# Patient Record
Sex: MALE | Race: WHITE | Employment: OTHER | URBAN - METROPOLITAN AREA
[De-identification: names, ages, dates, MRNs, and addresses within clinical notes are randomized per-mention and may not be internally consistent; named-entity substitution may affect disease eponyms.]

---

## 2017-01-24 ENCOUNTER — APPOINTMENT (OUTPATIENT)
Dept: LAB | Age: 82
End: 2017-01-24
Payer: MEDICARE

## 2017-01-24 ENCOUNTER — HOSPITAL ENCOUNTER (OUTPATIENT)
Dept: RADIOLOGY | Age: 82
Discharge: HOME | End: 2017-01-24
Payer: MEDICARE

## 2017-01-24 ENCOUNTER — TRANSCRIBE ORDERS (OUTPATIENT)
Dept: ADMINISTRATIVE | Age: 82
End: 2017-01-24

## 2017-01-24 DIAGNOSIS — C67.9 MALIGNANT NEOPLASM OF OTHER SPECIFIED SITES OF BLADDER: ICD-10-CM

## 2017-01-24 DIAGNOSIS — C67.9 MALIGNANT NEOPLASM OF OTHER SPECIFIED SITES OF BLADDER: Primary | ICD-10-CM

## 2017-01-24 LAB
ALBUMIN SERPL BCP-MCNC: 4 G/DL (ref 3.5–5)
ALP SERPL-CCNC: 91 U/L (ref 46–116)
ALT SERPL W P-5'-P-CCNC: 34 U/L (ref 12–78)
ANION GAP SERPL CALCULATED.3IONS-SCNC: 11 MMOL/L (ref 4–13)
AST SERPL W P-5'-P-CCNC: 23 U/L (ref 5–45)
ATRIAL RATE: 77 BPM
BASOPHILS # BLD AUTO: 0.04 THOUSANDS/ΜL (ref 0–0.1)
BASOPHILS NFR BLD AUTO: 1 % (ref 0–1)
BILIRUB SERPL-MCNC: 1.19 MG/DL (ref 0.2–1)
BUN SERPL-MCNC: 20 MG/DL (ref 5–25)
CALCIUM SERPL-MCNC: 9.4 MG/DL (ref 8.3–10.1)
CHLORIDE SERPL-SCNC: 105 MMOL/L (ref 100–108)
CO2 SERPL-SCNC: 24 MMOL/L (ref 21–32)
CREAT SERPL-MCNC: 1.68 MG/DL (ref 0.6–1.3)
EOSINOPHIL # BLD AUTO: 0.21 THOUSAND/ΜL (ref 0–0.61)
EOSINOPHIL NFR BLD AUTO: 3 % (ref 0–6)
ERYTHROCYTE [DISTWIDTH] IN BLOOD BY AUTOMATED COUNT: 13.4 % (ref 11.6–15.1)
GFR SERPL CREATININE-BSD FRML MDRD: 39.3 ML/MIN/1.73SQ M
GLUCOSE SERPL-MCNC: 97 MG/DL (ref 65–140)
HCT VFR BLD AUTO: 42.9 % (ref 36.5–49.3)
HGB BLD-MCNC: 15.2 G/DL (ref 12–17)
LYMPHOCYTES # BLD AUTO: 1.56 THOUSANDS/ΜL (ref 0.6–4.47)
LYMPHOCYTES NFR BLD AUTO: 21 % (ref 14–44)
MCH RBC QN AUTO: 31.4 PG (ref 26.8–34.3)
MCHC RBC AUTO-ENTMCNC: 35.4 G/DL (ref 31.4–37.4)
MCV RBC AUTO: 89 FL (ref 82–98)
MONOCYTES # BLD AUTO: 0.57 THOUSAND/ΜL (ref 0.17–1.22)
MONOCYTES NFR BLD AUTO: 8 % (ref 4–12)
NEUTROPHILS # BLD AUTO: 5.09 THOUSANDS/ΜL (ref 1.85–7.62)
NEUTS SEG NFR BLD AUTO: 67 % (ref 43–75)
NRBC BLD AUTO-RTO: 0 /100 WBCS
P AXIS: 10 DEGREES
PLATELET # BLD AUTO: 210 THOUSANDS/UL (ref 149–390)
PMV BLD AUTO: 11.6 FL (ref 8.9–12.7)
POTASSIUM SERPL-SCNC: 4.1 MMOL/L (ref 3.5–5.3)
PR INTERVAL: 184 MS
PROT SERPL-MCNC: 7.4 G/DL (ref 6.4–8.2)
QRS AXIS: -22 DEGREES
QRSD INTERVAL: 94 MS
QT INTERVAL: 398 MS
QTC INTERVAL: 450 MS
RBC # BLD AUTO: 4.84 MILLION/UL (ref 3.88–5.62)
SODIUM SERPL-SCNC: 140 MMOL/L (ref 136–145)
T WAVE AXIS: 16 DEGREES
VENTRICULAR RATE: 77 BPM
WBC # BLD AUTO: 7.48 THOUSAND/UL (ref 4.31–10.16)

## 2017-01-24 PROCEDURE — 36415 COLL VENOUS BLD VENIPUNCTURE: CPT

## 2017-01-24 PROCEDURE — 71020 HB CHEST X-RAY 2VW FRONTAL&LATL: CPT

## 2017-01-24 PROCEDURE — 80053 COMPREHEN METABOLIC PANEL: CPT

## 2017-01-24 PROCEDURE — 85025 COMPLETE CBC W/AUTO DIFF WBC: CPT

## 2017-01-24 PROCEDURE — 93005 ELECTROCARDIOGRAM TRACING: CPT

## 2017-02-08 ENCOUNTER — APPOINTMENT (OUTPATIENT)
Dept: LAB | Age: 82
End: 2017-02-08
Payer: MEDICARE

## 2017-02-08 ENCOUNTER — TRANSCRIBE ORDERS (OUTPATIENT)
Dept: ADMINISTRATIVE | Age: 82
End: 2017-02-08

## 2017-02-08 DIAGNOSIS — E78.5 HYPERLIPIDEMIA: ICD-10-CM

## 2017-02-08 DIAGNOSIS — N18.30 CHRONIC KIDNEY DISEASE, STAGE III (MODERATE) (HCC): ICD-10-CM

## 2017-02-08 DIAGNOSIS — R73.9 HYPERGLYCEMIA: ICD-10-CM

## 2017-02-08 LAB
ALBUMIN SERPL BCP-MCNC: 3.8 G/DL (ref 3.5–5)
ALP SERPL-CCNC: 97 U/L (ref 46–116)
ALT SERPL W P-5'-P-CCNC: 28 U/L (ref 12–78)
ANION GAP SERPL CALCULATED.3IONS-SCNC: 8 MMOL/L (ref 4–13)
AST SERPL W P-5'-P-CCNC: 19 U/L (ref 5–45)
BASOPHILS # BLD AUTO: 0.03 THOUSANDS/ΜL (ref 0–0.1)
BASOPHILS NFR BLD AUTO: 0 % (ref 0–1)
BILIRUB SERPL-MCNC: 0.95 MG/DL (ref 0.2–1)
BUN SERPL-MCNC: 19 MG/DL (ref 5–25)
CALCIUM SERPL-MCNC: 9.2 MG/DL (ref 8.3–10.1)
CHLORIDE SERPL-SCNC: 106 MMOL/L (ref 100–108)
CHOLEST SERPL-MCNC: 100 MG/DL (ref 50–200)
CO2 SERPL-SCNC: 26 MMOL/L (ref 21–32)
CREAT SERPL-MCNC: 1.45 MG/DL (ref 0.6–1.3)
CREAT UR-MCNC: 106 MG/DL
EOSINOPHIL # BLD AUTO: 0.62 THOUSAND/ΜL (ref 0–0.61)
EOSINOPHIL NFR BLD AUTO: 8 % (ref 0–6)
ERYTHROCYTE [DISTWIDTH] IN BLOOD BY AUTOMATED COUNT: 13.6 % (ref 11.6–15.1)
EST. AVERAGE GLUCOSE BLD GHB EST-MCNC: 100 MG/DL
GFR SERPL CREATININE-BSD FRML MDRD: 46.6 ML/MIN/1.73SQ M
GLUCOSE SERPL-MCNC: 93 MG/DL (ref 65–140)
HBA1C MFR BLD: 5.1 % (ref 4.2–6.3)
HCT VFR BLD AUTO: 41.6 % (ref 36.5–49.3)
HDLC SERPL-MCNC: 49 MG/DL (ref 40–60)
HGB BLD-MCNC: 14.5 G/DL (ref 12–17)
LDLC SERPL DIRECT ASSAY-MCNC: 46 MG/DL (ref 0–100)
LYMPHOCYTES # BLD AUTO: 1.23 THOUSANDS/ΜL (ref 0.6–4.47)
LYMPHOCYTES NFR BLD AUTO: 17 % (ref 14–44)
MCH RBC QN AUTO: 31.3 PG (ref 26.8–34.3)
MCHC RBC AUTO-ENTMCNC: 34.9 G/DL (ref 31.4–37.4)
MCV RBC AUTO: 90 FL (ref 82–98)
MONOCYTES # BLD AUTO: 0.57 THOUSAND/ΜL (ref 0.17–1.22)
MONOCYTES NFR BLD AUTO: 8 % (ref 4–12)
NEUTROPHILS # BLD AUTO: 4.93 THOUSANDS/ΜL (ref 1.85–7.62)
NEUTS SEG NFR BLD AUTO: 67 % (ref 43–75)
NRBC BLD AUTO-RTO: 0 /100 WBCS
PLATELET # BLD AUTO: 199 THOUSANDS/UL (ref 149–390)
PMV BLD AUTO: 11.5 FL (ref 8.9–12.7)
POTASSIUM SERPL-SCNC: 4.2 MMOL/L (ref 3.5–5.3)
PROT SERPL-MCNC: 7.4 G/DL (ref 6.4–8.2)
PROT UR-MCNC: 44 MG/DL
PROT/CREAT UR: 0.42 MG/G{CREAT} (ref 0–0.1)
RBC # BLD AUTO: 4.63 MILLION/UL (ref 3.88–5.62)
SODIUM SERPL-SCNC: 140 MMOL/L (ref 136–145)
TRIGL SERPL-MCNC: 61 MG/DL
WBC # BLD AUTO: 7.39 THOUSAND/UL (ref 4.31–10.16)

## 2017-02-08 PROCEDURE — 83721 ASSAY OF BLOOD LIPOPROTEIN: CPT

## 2017-02-08 PROCEDURE — 80053 COMPREHEN METABOLIC PANEL: CPT

## 2017-02-08 PROCEDURE — 83036 HEMOGLOBIN GLYCOSYLATED A1C: CPT

## 2017-02-08 PROCEDURE — 36415 COLL VENOUS BLD VENIPUNCTURE: CPT

## 2017-02-08 PROCEDURE — 84156 ASSAY OF PROTEIN URINE: CPT

## 2017-02-08 PROCEDURE — 85025 COMPLETE CBC W/AUTO DIFF WBC: CPT

## 2017-02-08 PROCEDURE — 80061 LIPID PANEL: CPT

## 2017-02-08 PROCEDURE — 82570 ASSAY OF URINE CREATININE: CPT

## 2017-02-15 ENCOUNTER — ALLSCRIPTS OFFICE VISIT (OUTPATIENT)
Dept: OTHER | Facility: OTHER | Age: 82
End: 2017-02-15

## 2017-03-09 ENCOUNTER — GENERIC CONVERSION - ENCOUNTER (OUTPATIENT)
Dept: OTHER | Facility: OTHER | Age: 82
End: 2017-03-09

## 2017-03-22 ENCOUNTER — GENERIC CONVERSION - ENCOUNTER (OUTPATIENT)
Dept: OTHER | Facility: OTHER | Age: 82
End: 2017-03-22

## 2017-04-04 ENCOUNTER — GENERIC CONVERSION - ENCOUNTER (OUTPATIENT)
Dept: OTHER | Facility: OTHER | Age: 82
End: 2017-04-04

## 2017-05-30 ENCOUNTER — APPOINTMENT (OUTPATIENT)
Dept: LAB | Age: 82
End: 2017-05-30
Payer: MEDICARE

## 2017-05-30 ENCOUNTER — TRANSCRIBE ORDERS (OUTPATIENT)
Dept: ADMINISTRATIVE | Age: 82
End: 2017-05-30

## 2017-05-30 DIAGNOSIS — C67.8 MALIGNANT NEOPLASM OF OVERLAPPING SITES OF BLADDER (HCC): ICD-10-CM

## 2017-05-30 DIAGNOSIS — C67.8 MALIGNANT NEOPLASM OF OVERLAPPING SITES OF BLADDER (HCC): Primary | ICD-10-CM

## 2017-05-30 LAB
ANION GAP SERPL CALCULATED.3IONS-SCNC: 6 MMOL/L (ref 4–13)
BASOPHILS # BLD AUTO: 0.04 THOUSANDS/ΜL (ref 0–0.1)
BASOPHILS NFR BLD AUTO: 1 % (ref 0–1)
BUN SERPL-MCNC: 31 MG/DL (ref 5–25)
CALCIUM SERPL-MCNC: 9.5 MG/DL (ref 8.3–10.1)
CHLORIDE SERPL-SCNC: 110 MMOL/L (ref 100–108)
CO2 SERPL-SCNC: 27 MMOL/L (ref 21–32)
CREAT SERPL-MCNC: 1.47 MG/DL (ref 0.6–1.3)
EOSINOPHIL # BLD AUTO: 0.3 THOUSAND/ΜL (ref 0–0.61)
EOSINOPHIL NFR BLD AUTO: 5 % (ref 0–6)
ERYTHROCYTE [DISTWIDTH] IN BLOOD BY AUTOMATED COUNT: 13.8 % (ref 11.6–15.1)
GFR SERPL CREATININE-BSD FRML MDRD: 45.9 ML/MIN/1.73SQ M
GLUCOSE P FAST SERPL-MCNC: 109 MG/DL (ref 65–99)
HCT VFR BLD AUTO: 44.3 % (ref 36.5–49.3)
HGB BLD-MCNC: 15.3 G/DL (ref 12–17)
LYMPHOCYTES # BLD AUTO: 1.53 THOUSANDS/ΜL (ref 0.6–4.47)
LYMPHOCYTES NFR BLD AUTO: 23 % (ref 14–44)
MCH RBC QN AUTO: 30.7 PG (ref 26.8–34.3)
MCHC RBC AUTO-ENTMCNC: 34.5 G/DL (ref 31.4–37.4)
MCV RBC AUTO: 89 FL (ref 82–98)
MONOCYTES # BLD AUTO: 0.56 THOUSAND/ΜL (ref 0.17–1.22)
MONOCYTES NFR BLD AUTO: 8 % (ref 4–12)
NEUTROPHILS # BLD AUTO: 4.29 THOUSANDS/ΜL (ref 1.85–7.62)
NEUTS SEG NFR BLD AUTO: 63 % (ref 43–75)
NRBC BLD AUTO-RTO: 0 /100 WBCS
PLATELET # BLD AUTO: 191 THOUSANDS/UL (ref 149–390)
PMV BLD AUTO: 11.6 FL (ref 8.9–12.7)
POTASSIUM SERPL-SCNC: 4.1 MMOL/L (ref 3.5–5.3)
RBC # BLD AUTO: 4.98 MILLION/UL (ref 3.88–5.62)
SODIUM SERPL-SCNC: 143 MMOL/L (ref 136–145)
WBC # BLD AUTO: 6.74 THOUSAND/UL (ref 4.31–10.16)

## 2017-05-30 PROCEDURE — 85025 COMPLETE CBC W/AUTO DIFF WBC: CPT

## 2017-05-30 PROCEDURE — 80048 BASIC METABOLIC PNL TOTAL CA: CPT

## 2017-05-30 PROCEDURE — 36415 COLL VENOUS BLD VENIPUNCTURE: CPT

## 2017-06-08 ENCOUNTER — GENERIC CONVERSION - ENCOUNTER (OUTPATIENT)
Dept: OTHER | Facility: OTHER | Age: 82
End: 2017-06-08

## 2017-08-07 DIAGNOSIS — E78.5 HYPERLIPIDEMIA: ICD-10-CM

## 2017-08-07 DIAGNOSIS — N18.30 CHRONIC KIDNEY DISEASE, STAGE III (MODERATE) (HCC): ICD-10-CM

## 2017-08-07 DIAGNOSIS — E55.9 VITAMIN D DEFICIENCY: ICD-10-CM

## 2017-08-07 DIAGNOSIS — I10 ESSENTIAL (PRIMARY) HYPERTENSION: ICD-10-CM

## 2017-08-07 DIAGNOSIS — R73.9 HYPERGLYCEMIA: ICD-10-CM

## 2017-08-09 ENCOUNTER — APPOINTMENT (OUTPATIENT)
Dept: LAB | Age: 82
End: 2017-08-09
Payer: MEDICARE

## 2017-08-09 ENCOUNTER — TRANSCRIBE ORDERS (OUTPATIENT)
Dept: ADMINISTRATIVE | Age: 82
End: 2017-08-09

## 2017-08-09 DIAGNOSIS — I10 ESSENTIAL (PRIMARY) HYPERTENSION: ICD-10-CM

## 2017-08-09 DIAGNOSIS — R73.9 HYPERGLYCEMIA: ICD-10-CM

## 2017-08-09 DIAGNOSIS — E78.5 HYPERLIPIDEMIA: ICD-10-CM

## 2017-08-09 DIAGNOSIS — E55.9 VITAMIN D DEFICIENCY: ICD-10-CM

## 2017-08-09 DIAGNOSIS — N18.30 CHRONIC KIDNEY DISEASE, STAGE III (MODERATE) (HCC): ICD-10-CM

## 2017-08-09 LAB
25(OH)D3 SERPL-MCNC: 29.9 NG/ML (ref 30–100)
ALBUMIN SERPL BCP-MCNC: 4.1 G/DL (ref 3.5–5)
ALP SERPL-CCNC: 97 U/L (ref 46–116)
ALT SERPL W P-5'-P-CCNC: 31 U/L (ref 12–78)
ANION GAP SERPL CALCULATED.3IONS-SCNC: 8 MMOL/L (ref 4–13)
AST SERPL W P-5'-P-CCNC: 25 U/L (ref 5–45)
BILIRUB SERPL-MCNC: 0.9 MG/DL (ref 0.2–1)
BUN SERPL-MCNC: 26 MG/DL (ref 5–25)
CALCIUM SERPL-MCNC: 9.7 MG/DL (ref 8.3–10.1)
CHLORIDE SERPL-SCNC: 106 MMOL/L (ref 100–108)
CHOLEST SERPL-MCNC: 98 MG/DL (ref 50–200)
CO2 SERPL-SCNC: 25 MMOL/L (ref 21–32)
CREAT SERPL-MCNC: 1.73 MG/DL (ref 0.6–1.3)
EST. AVERAGE GLUCOSE BLD GHB EST-MCNC: 103 MG/DL
GFR SERPL CREATININE-BSD FRML MDRD: 36 ML/MIN/1.73SQ M
GLUCOSE P FAST SERPL-MCNC: 103 MG/DL (ref 65–99)
HBA1C MFR BLD: 5.2 % (ref 4.2–6.3)
HDLC SERPL-MCNC: 48 MG/DL (ref 40–60)
LDLC SERPL DIRECT ASSAY-MCNC: 46 MG/DL (ref 0–100)
POTASSIUM SERPL-SCNC: 4 MMOL/L (ref 3.5–5.3)
PROT SERPL-MCNC: 7.6 G/DL (ref 6.4–8.2)
SODIUM SERPL-SCNC: 139 MMOL/L (ref 136–145)
TRIGL SERPL-MCNC: 64 MG/DL

## 2017-08-09 PROCEDURE — 80061 LIPID PANEL: CPT

## 2017-08-09 PROCEDURE — 82306 VITAMIN D 25 HYDROXY: CPT

## 2017-08-09 PROCEDURE — 80053 COMPREHEN METABOLIC PANEL: CPT

## 2017-08-09 PROCEDURE — 83721 ASSAY OF BLOOD LIPOPROTEIN: CPT

## 2017-08-09 PROCEDURE — 83036 HEMOGLOBIN GLYCOSYLATED A1C: CPT

## 2017-08-09 PROCEDURE — 36415 COLL VENOUS BLD VENIPUNCTURE: CPT

## 2017-08-14 ENCOUNTER — ALLSCRIPTS OFFICE VISIT (OUTPATIENT)
Dept: OTHER | Facility: OTHER | Age: 82
End: 2017-08-14

## 2017-09-07 ENCOUNTER — GENERIC CONVERSION - ENCOUNTER (OUTPATIENT)
Dept: OTHER | Facility: OTHER | Age: 82
End: 2017-09-07

## 2017-11-20 ENCOUNTER — GENERIC CONVERSION - ENCOUNTER (OUTPATIENT)
Dept: INTERNAL MEDICINE CLINIC | Facility: CLINIC | Age: 82
End: 2017-11-20

## 2017-11-20 ENCOUNTER — GENERIC CONVERSION - ENCOUNTER (OUTPATIENT)
Dept: OTHER | Facility: OTHER | Age: 82
End: 2017-11-20

## 2017-12-04 ENCOUNTER — TRANSCRIBE ORDERS (OUTPATIENT)
Dept: ADMINISTRATIVE | Age: 82
End: 2017-12-04

## 2017-12-04 ENCOUNTER — APPOINTMENT (OUTPATIENT)
Dept: LAB | Age: 82
End: 2017-12-04
Payer: MEDICARE

## 2017-12-04 ENCOUNTER — HOSPITAL ENCOUNTER (OUTPATIENT)
Dept: NON INVASIVE DIAGNOSTICS | Facility: CLINIC | Age: 82
Discharge: HOME | End: 2017-12-04
Payer: MEDICARE

## 2017-12-04 ENCOUNTER — GENERIC CONVERSION - ENCOUNTER (OUTPATIENT)
Dept: OTHER | Facility: OTHER | Age: 82
End: 2017-12-04

## 2017-12-04 DIAGNOSIS — I65.29 OCCLUSION AND STENOSIS OF UNSPECIFIED CAROTID ARTERY: ICD-10-CM

## 2017-12-04 DIAGNOSIS — I10 ESSENTIAL (PRIMARY) HYPERTENSION: ICD-10-CM

## 2017-12-04 DIAGNOSIS — E78.5 HYPERLIPIDEMIA: ICD-10-CM

## 2017-12-04 LAB
ALBUMIN SERPL BCP-MCNC: 3.6 G/DL (ref 3.5–5)
ALP SERPL-CCNC: 97 U/L (ref 46–116)
ALT SERPL W P-5'-P-CCNC: 32 U/L (ref 12–78)
ANION GAP SERPL CALCULATED.3IONS-SCNC: 8 MMOL/L (ref 4–13)
AST SERPL W P-5'-P-CCNC: 28 U/L (ref 5–45)
BASOPHILS # BLD AUTO: 0.04 THOUSANDS/ΜL (ref 0–0.1)
BASOPHILS NFR BLD AUTO: 0 % (ref 0–1)
BILIRUB SERPL-MCNC: 0.58 MG/DL (ref 0.2–1)
BUN SERPL-MCNC: 20 MG/DL (ref 5–25)
CALCIUM SERPL-MCNC: 9.3 MG/DL (ref 8.3–10.1)
CHLORIDE SERPL-SCNC: 105 MMOL/L (ref 100–108)
CO2 SERPL-SCNC: 27 MMOL/L (ref 21–32)
CREAT SERPL-MCNC: 1.28 MG/DL (ref 0.6–1.3)
EOSINOPHIL # BLD AUTO: 0.2 THOUSAND/ΜL (ref 0–0.61)
EOSINOPHIL NFR BLD AUTO: 2 % (ref 0–6)
ERYTHROCYTE [DISTWIDTH] IN BLOOD BY AUTOMATED COUNT: 13.5 % (ref 11.6–15.1)
ERYTHROCYTE [SEDIMENTATION RATE] IN BLOOD: 33 MM/HOUR (ref 0–10)
GFR SERPL CREATININE-BSD FRML MDRD: 51 ML/MIN/1.73SQ M
GLUCOSE SERPL-MCNC: 81 MG/DL (ref 65–140)
HCT VFR BLD AUTO: 40 % (ref 36.5–49.3)
HGB BLD-MCNC: 14 G/DL (ref 12–17)
LYMPHOCYTES # BLD AUTO: 1.6 THOUSANDS/ΜL (ref 0.6–4.47)
LYMPHOCYTES NFR BLD AUTO: 17 % (ref 14–44)
MCH RBC QN AUTO: 31 PG (ref 26.8–34.3)
MCHC RBC AUTO-ENTMCNC: 35 G/DL (ref 31.4–37.4)
MCV RBC AUTO: 89 FL (ref 82–98)
MONOCYTES # BLD AUTO: 0.9 THOUSAND/ΜL (ref 0.17–1.22)
MONOCYTES NFR BLD AUTO: 10 % (ref 4–12)
NEUTROPHILS # BLD AUTO: 6.42 THOUSANDS/ΜL (ref 1.85–7.62)
NEUTS SEG NFR BLD AUTO: 71 % (ref 43–75)
NRBC BLD AUTO-RTO: 0 /100 WBCS
PLATELET # BLD AUTO: 284 THOUSANDS/UL (ref 149–390)
PMV BLD AUTO: 10.6 FL (ref 8.9–12.7)
POTASSIUM SERPL-SCNC: 4.2 MMOL/L (ref 3.5–5.3)
PROT SERPL-MCNC: 7.6 G/DL (ref 6.4–8.2)
RBC # BLD AUTO: 4.52 MILLION/UL (ref 3.88–5.62)
SODIUM SERPL-SCNC: 140 MMOL/L (ref 136–145)
TSH SERPL DL<=0.05 MIU/L-ACNC: 3.28 UIU/ML (ref 0.36–3.74)
VIT B12 SERPL-MCNC: 595 PG/ML (ref 100–900)
WBC # BLD AUTO: 9.19 THOUSAND/UL (ref 4.31–10.16)

## 2017-12-04 PROCEDURE — 93880 EXTRACRANIAL BILAT STUDY: CPT

## 2017-12-04 PROCEDURE — 85652 RBC SED RATE AUTOMATED: CPT

## 2017-12-04 PROCEDURE — 84443 ASSAY THYROID STIM HORMONE: CPT

## 2017-12-04 PROCEDURE — 80053 COMPREHEN METABOLIC PANEL: CPT

## 2017-12-04 PROCEDURE — 85025 COMPLETE CBC W/AUTO DIFF WBC: CPT

## 2017-12-04 PROCEDURE — 36415 COLL VENOUS BLD VENIPUNCTURE: CPT

## 2017-12-04 PROCEDURE — 82607 VITAMIN B-12: CPT

## 2017-12-28 ENCOUNTER — GENERIC CONVERSION - ENCOUNTER (OUTPATIENT)
Dept: OTHER | Facility: OTHER | Age: 82
End: 2017-12-28

## 2018-01-13 NOTE — PROGRESS NOTES
Assessment    1  Malignant tumor of urinary bladder (188 9) (C67 9)    Plan  Malignant tumor of urinary bladder    · Urine Dip Non-Automated- POC; Status:Complete - Retrospective By Protocol  Authorization;   Done: [id number] 12:54PM   Performed: In Office; RBV:[id number]; Last Updated By:Ewa Silverio; 1/21/2016 12:56:09 PM;Ordered; For:Malignant tumor of urinary bladder; Ordered By:Tomi Elkins; Discussion/Summary  Discussion Summary:   40-year-old male with recurrent superficial high-grade bladder cancer  I reviewed the pathology results with the patient and his daughter  He had a small focus of high-grade cancer  There was no evidence of muscle invasion  We will try another course of induction BCG therapy  If the patient is to fail this he will need more aggressive therapy such as cystectomy or chemotherapy with radiation  Chief Complaint  Chief Complaint Free Text Note Form: s/p TURBT (1-6-16)      History of Present Illness  HPI: 40-year-old male presents for followup for high-grade bladder cancer status post recent reresection of his previous tumor site  The patient did well after the procedure  He has no new complaints  Review of Systems  Complete-Male Urology:   Constitutional: No fever or chills, feels well, no tiredness, no recent weight gain or weight loss  Respiratory: No complaints of shortness of breath, no wheezing, no cough, no SOB on exertion, no orthopnea or PND  Cardiovascular: No complaints of slow heart rate, no fast heart rate, no chest pain, no palpitations, no leg claudication, no lower extremity  Gastrointestinal: No complaints of abdominal pain, no constipation, no nausea or vomiting, no diarrhea or bloody stools  Genitourinary: Empty sensation and stream quality good, but no dysuria, no urinary hesitancy, no hematuria, no incontinence and no feelings of urinary urgency    The patient presents with complaints of nocturia (1x)     Musculoskeletal: No complaints of arthralgia, no myalgias, no joint swelling or stiffness, no limb pain or swelling  Integumentary: No complaints of skin rash or skin lesions, no itching, no skin wound, no dry skin  Hematologic/Lymphatic: No complaints of swollen glands, no swollen glands in the neck, does not bleed easily, no easy bruising  Neurological: No compliants of headache, no confusion, no convulsions, no numbness or tingling, no dizziness or fainting, no limb weakness, no difficulty walking  ROS Reviewed:   ROS reviewed  Active Problems    1  Arthritis (716 90) (M19 90)   2  Asymptomatic carotid artery stenosis (433 10) (I65 29)   3  Atherosclerosis (440 9) (I70 90)   4  Benign prostatic hypertrophy (600 00) (N40 0)   5  Bladder cancer (188 9) (C67 9)   6  CAD (coronary atherosclerotic disease) (414 00) (I25 10)   7  Chronic kidney disease, stage 3 (585 3) (N18 3)   8  Chronic obstructive pulmonary disease (496) (J44 9)   9  Dyspnea (786 09) (R06 00)   10  Flank pain (789 09) (R10 9)   11  Hematuria (599 70) (R31 9)   12  Hyperglycemia (790 29) (R73 9)   13  Hyperlipidemia (272 4) (E78 5)   14  Hypertension (401 9) (I10)   15  Malignant tumor of urinary bladder (188 9) (C67 9)   16  Microhematuria (599 72) (R31 2)   17  Need for prophylactic vaccination and inoculation against influenza (V04 81) (Z23)   18  Need for vaccination with 13-polyvalent pneumococcal conjugate vaccine (V03 82) (Z23)   19  Pain, joint, shoulder (719 41) (M25 519)   20  Right rotator cuff tear (840 4) (M75 101)   21  Visit for pre-operative examination (V72 84) (Z01 818)   22  Visit for pre-operative examination (V72 84) (Q07 193)    Past Medical History    1  History of urinary tract infection (V13 02) (Z87 440)   2  History of Postoperative visit (V58 49) (Z48 89)   3  History of Preop pulmonary/respiratory exam (V72 82) (L72 989)  Active Problems And Past Medical History Reviewed:    The active problems and past medical history were reviewed and updated today  Surgical History    1  History of CABG   2  History of Cardiovascular Stress Test   3  History of Colonoscopy (Fiberoptic)   4  History of Diagnostic Cystoscopy   5  History of Tonsillectomy (28 2)  Surgical History Reviewed: The surgical history was reviewed and updated today  Family History    1  Family history of Atherosclerosis (V17 49)   2  Family history of Hyperlipidemia    3  Family history of Hyperlipidemia    4  Family history of Atherosclerosis (V17 49)   5  Family history of Hyperlipidemia   6  Family history of Hypertension (V17 49)   7  Family history of Osteoarthritis (V17 7)  Family History Reviewed: The family history was reviewed and updated today  Social History    · Being A Social Drinker   · Daily Coffee Consumption (5  Cups/Day)   · Denied: History of Drug Use   · Former smoker (Q11 80) (J02 421)  Social History Reviewed: The social history was reviewed and updated today  Current Meds   1  AmLODIPine Besylate 5 MG Oral Tablet; TAKE 1 TABLET DAILY; Therapy: (Recorded:31Btf2883) to Recorded   2  Aspirin 81 MG Oral Tablet Delayed Release; TAKE 1 TABLET DAILY; Therapy: (Recorded:67Jig8205) to Recorded   3  Atorvastatin Calcium 80 MG Oral Tablet; TAKE 1 TABLET AT BEDTIME; Therapy: (Recorded:61Ycs4209) to Recorded   4  Fosinopril Sodium 10 MG Oral Tablet; take 1/2 tablet daily; Therapy: (Recorded:42Xse5706) to Recorded   5  Glucosamine-Chondroitin Oral Tablet; Take 1 tablet daily; Therapy: (Recorded:84Ahd6163) to Recorded   6  Loratadine 10 MG Oral Tablet; TAKE 1 TABLET DAILY AS NEEDED; Therapy: 27CSF4118 to Recorded   7  Metoprolol Succinate ER 25 MG Oral Tablet Extended Release 24 Hour; TAKE 1 TABLET   ONCE DAILY; Therapy: (Recorded:27Taf1023) to Recorded   8  Omega-3 Fish Oil 1000 MG Oral Capsule; 2 CAPSULES DAILY; Therapy: 46WBP7941 to Recorded   9   Proventil  (90 Base) MCG/ACT Inhalation Aerosol Solution; INHALE 2 PUFFS BY   MOUTH EVERY 6 HOURS AS NEEDED; Therapy: 00FCD6388 to (Evaluate:11Aug2015); Last Rx:08Jil3259 Ordered   10  Singulair 10 MG Oral Tablet; TAKE 1 TABLET DAILY; Therapy: (Recorded:12Feb2015) to Recorded   11  Spiriva HandiHaler 18 MCG Inhalation Capsule; inhale the contents of one capsule in    the handihaler once daily; Therapy: 09Apr2012 to (Evaluate:21Apr2016)  Requested for: 30Apr2015; Last    Rx:27Apr2015 Ordered   12  Symbicort 160-4 5 MCG/ACT Inhalation Aerosol; INHALE 2 PUFFS TWICE DAILY  RINSE    MOUTH AFTER USE; Therapy: (Recorded:27Mmv6684) to Recorded   13  Zetia 10 MG Oral Tablet; TAKE 1 TABLET DAILY; Therapy: (Recorded:12Feb2015) to Recorded  Medication List Reviewed: The medication list was reviewed and updated today  Allergies    1  ACE Inhibitors   2  Ciprofloxacin HCl TABS   3  Erythromycin Base TABS   4  Lamisil CREA   5  Lisinopril TABS   6  NSAIDs    Vitals  Vital Signs [Data Includes: Current Encounter]    Recorded: 21Jan2016 12:50PM   Heart Rate 80   Systolic 609   Diastolic 70   Height 5 ft 7 in   Weight 174 lb 8 0 oz   BMI Calculated 27 33   BSA Calculated 1 91     Physical Exam    Constitutional   General appearance: No acute distress, well appearing and well nourished  Pulmonary   Respiratory effort: No increased work of breathing or signs of respiratory distress  Cardiovascular   Examination of extremities for edema and/or varicosities: Normal     Abdomen   Abdomen: Non-tender, no masses  Liver and spleen: No hepatomegaly or splenomegaly  Genitourinary   Scrotum contents: Normal size, no masses  Penis: Normal, no lesions      Additional Exam:  Neuro exam nonfocal       Results/Data  Encounter Results   Urine Dip Non-Automated- POC 11ZEA1153 12:54PM [place]     Test Name Result Flag Reference   Color Yellow     Clarity Transparent     Leukocytes -     Nitrite -     Blood moderate     Protein -     Ph 6 5     Specific Gravity 1 015     Ketone -     Glucose - Future Appointments    Date/Time Provider Specialty Site   03/02/2016 01:00 PM [facility]  [facility] [address] [address] [place]   03/09/2016 01:00 PM [facility] Schedule  [facility] [address] [address] [place]   03/16/2016 01:00 PM [facility]  [facility] [address] [address] [place]   03/23/2016 01:00 PM [facility] Schedule  [facility] [address] [address] [place]   03/30/2016 01:00 PM [facility] Schedule  [facility] [address] [address] [place]   04/06/2016 01:00 PM [facility]  [facility] [address] [address] [place]   04/12/2016 08:00 AM [facility] Adams   Internal Medicine Sanjana Darling MD     Signatures   Electronically signed by : LORNA Álvarez ; Jan 21 2016  1:29PM EST                       (Author)

## 2018-01-14 VITALS
DIASTOLIC BLOOD PRESSURE: 78 MMHG | SYSTOLIC BLOOD PRESSURE: 130 MMHG | BODY MASS INDEX: 27.49 KG/M2 | HEIGHT: 67 IN | RESPIRATION RATE: 14 BRPM | HEART RATE: 72 BPM | WEIGHT: 175.13 LBS

## 2018-01-14 VITALS
HEART RATE: 78 BPM | HEIGHT: 67 IN | BODY MASS INDEX: 27.94 KG/M2 | RESPIRATION RATE: 14 BRPM | WEIGHT: 178 LBS | DIASTOLIC BLOOD PRESSURE: 74 MMHG | SYSTOLIC BLOOD PRESSURE: 134 MMHG

## 2018-01-15 ENCOUNTER — TRANSCRIBE ORDERS (OUTPATIENT)
Dept: ADMINISTRATIVE | Facility: HOSPITAL | Age: 83
End: 2018-01-15

## 2018-01-15 DIAGNOSIS — R41.3 MEMORY LOSS: Primary | ICD-10-CM

## 2018-01-15 NOTE — PROCEDURES
Plan  Bladder cancer    · Shell BCG 50 MG Intravesical Suspension Reconstituted   Rx By: Dania Sequeira; For: Bladder cancer; Dose of 50 MG; Intravesical; SOHAIL = N; Administered by: Ruchi Harding RN: 3/23/2016 12:58:00 PM; Last Updated By: Ruchi Harding   · Urine Dip Non-Automated- POC; Status:Complete - Retrospective By Protocol  Authorization;   Done: 64TJQ1074 12:55PM   Performed: In Office; CYY:[id number];DEMGXVS; For:Bladder cancer; Ordered By:Tomi Elkins; Chief Complaint  Chief Complaint Free Text Note Form: Patient presents for BCG 4/6: bladder cancer      Review of Systems  Complete-Male Urology:   Genitourinary: Empty sensation and stream quality good, but no dysuria, no urinary hesitancy, no hematuria, no incontinence and no feelings of urinary urgency    The patient presents with complaints of nocturia (2x)  Active Problems    1  Arthritis (716 90) (M19 90)   2  Asymptomatic carotid artery stenosis (433 10) (I65 29)   3  Atherosclerosis (440 9) (I70 90)   4  Benign prostatic hypertrophy (600 00) (N40 0)   5  Bladder cancer (188 9) (C67 9)   6  CAD (coronary atherosclerotic disease) (414 00) (I25 10)   7  Chronic kidney disease, stage 3 (585 3) (N18 3)   8  Chronic obstructive pulmonary disease (496) (J44 9)   9  Dyspnea (786 09) (R06 00)   10  Flank pain (789 09) (R10 9)   11  Hematuria (599 70) (R31 9)   12  Hyperglycemia (790 29) (R73 9)   13  Hyperlipidemia (272 4) (E78 5)   14  Hypertension (401 9) (I10)   15  Malignant tumor of urinary bladder (188 9) (C67 9)   16  Microhematuria (599 72) (R31 2)   17  Need for prophylactic vaccination and inoculation against influenza (V04 81) (Z23)   18  Need for vaccination with 13-polyvalent pneumococcal conjugate vaccine (V03 82) (Z23)   19  Pain, joint, shoulder (719 41) (M25 519)   20  Right rotator cuff tear (840 4) (M75 101)   21  Visit for pre-operative examination (V72 84) (Z01 818)   22   Visit for pre-operative examination (V72 84) (T50 049)    Past Medical History    1  History of urinary tract infection (V13 02) (Z87 440)   2  History of Postoperative visit (V58 49) (Z48 89)   3  History of Preop pulmonary/respiratory exam (V72 82) (Z01 811)    Surgical History    1  History of CABG   2  History of Cardiovascular Stress Test   3  History of Colonoscopy (Fiberoptic)   4  History of Diagnostic Cystoscopy   5  History of Tonsillectomy (28 2)    Family History    1  Family history of Atherosclerosis (V17 49)   2  Family history of Hyperlipidemia    3  Family history of Hyperlipidemia    4  Family history of Atherosclerosis (V17 49)   5  Family history of Hyperlipidemia   6  Family history of Hypertension (V17 49)   7  Family history of Osteoarthritis (V17 7)    Social History    · Being A Social Drinker   · Daily Coffee Consumption (5  Cups/Day)   · Denied: History of Drug Use   · Former smoker (V15 82) (O29 721)    Current Meds   1  AmLODIPine Besylate 5 MG Oral Tablet; TAKE 1 TABLET DAILY; Therapy: (Recorded:45Iuu6748) to Recorded   2  Aspirin 81 MG Oral Tablet Delayed Release; TAKE 1 TABLET DAILY; Therapy: (Recorded:22Xsp8220) to Recorded   3  Atorvastatin Calcium 80 MG Oral Tablet; TAKE 1 TABLET AT BEDTIME; Therapy: (Recorded:49Thj8631) to Recorded   4  Fosinopril Sodium 10 MG Oral Tablet; take 1/2 tablet daily; Therapy: (Recorded:98Dbj8162) to Recorded   5  Glucosamine-Chondroitin Oral Tablet; Take 1 tablet daily; Therapy: (Recorded:61Ehg8914) to Recorded   6  Loratadine 10 MG Oral Tablet; TAKE 1 TABLET DAILY AS NEEDED; Therapy: 15RLS5618 to Recorded   7  Metoprolol Succinate ER 25 MG Oral Tablet Extended Release 24 Hour; TAKE 1 TABLET   ONCE DAILY; Therapy: (Recorded:94Jce8090) to Recorded   8  Omega-3 Fish Oil 1000 MG Oral Capsule; 2 CAPSULES DAILY; Therapy: 45ARB0140 to Recorded   9  Proventil  (90 Base) MCG/ACT Inhalation Aerosol Solution; INHALE 2 PUFFS BY   MOUTH EVERY 6 HOURS AS NEEDED;    Therapy: 58CXH0839 to (Evaluate:11Aug2015); Last Rx:15Hhg0530 Ordered   10  Singulair 10 MG Oral Tablet (Montelukast Sodium); TAKE 1 TABLET DAILY; Therapy: (Recorded:67Jgm5414) to Recorded   11  Spiriva HandiHaler 18 MCG Inhalation Capsule; inhale the contents of one capsule in    the handihaler once daily; Therapy: 09Apr2012 to (Evaluate:21Apr2016)  Requested for: 30Apr2015; Last    Rx:27Apr2015 Ordered   12  Symbicort 160-4 5 MCG/ACT Inhalation Aerosol; INHALE 2 PUFFS TWICE DAILY  RINSE    MOUTH AFTER USE; Therapy: (Recorded:23Hqp1381) to Recorded   13  Zetia 10 MG Oral Tablet; TAKE 1 TABLET DAILY; Therapy: (Recorded:46Jfz2789) to Recorded    Allergies    1  ACE Inhibitors   2  Ciprofloxacin HCl TABS   3  Erythromycin Base TABS   4  Lamisil CREA   5  Lisinopril TABS   6  NSAIDs    Vitals  Vital Signs [Data Includes: Current Encounter]    Recorded: [id number] 12:54PM   Heart Rate 72   Systolic 375   Diastolic 80   Height 5 ft 7 in   Weight 174 lb    BMI Calculated 27 25   BSA Calculated 1 91     Procedure  Sterile technique employed  Patient prepped and identified  Straight catheter placed  Bladder drained  The following solution was instilled directly into the bladder via catheter: 1 Vial BCG  Catheter removed  Patient discharged  Patient tolerated procedure   !$F Patient stated that his flu like symptoms were better last week  Future Appointments    Date/Time Provider Specialty Site   03/30/2016 01:00 PM [facility]   [address]   04/06/2016 01:00 [facility]   04/12/2016 08:00 AM [facility] Crespo   Internal Medicine Hernan Pena MD     Signatures   Electronically signed by : Solis Kuhn RN; Mar 23 2016 12:59PM EST                       (Author)    Electronically signed by : LORNA Velazquez ; Mar 25 2016  8:26AM EST

## 2018-01-22 VITALS — HEIGHT: 67 IN | BODY MASS INDEX: 28.16 KG/M2 | WEIGHT: 179.38 LBS

## 2018-01-22 VITALS — SYSTOLIC BLOOD PRESSURE: 130 MMHG | HEART RATE: 68 BPM | RESPIRATION RATE: 14 BRPM | DIASTOLIC BLOOD PRESSURE: 80 MMHG

## 2018-01-24 ENCOUNTER — OFFICE VISIT (OUTPATIENT)
Dept: AUDIOLOGY | Age: 83
End: 2018-01-24
Payer: MEDICARE

## 2018-01-24 VITALS — BODY MASS INDEX: 28.41 KG/M2 | HEIGHT: 67 IN | WEIGHT: 181 LBS

## 2018-01-24 DIAGNOSIS — H90.3 SENSORINEURAL HEARING LOSS, BILATERAL: Primary | ICD-10-CM

## 2018-01-24 PROCEDURE — V5264 EAR MOLD/INSERT: HCPCS | Performed by: AUDIOLOGIST

## 2018-01-24 NOTE — PROGRESS NOTES
Progress Note    Name:  Ruthann Chong  :  1934  Age:  80 y o  Date of Evaluation: 18   Time In: 9:30  Time Out: 9:45    Dispensed remade micro mold  Patient happy with fit  Patient reports concern over possible decrease in hearing in right ear  Discussed recommendation of hearing evaluation and either HAE or explore possibility of reprogramming current hearing aid          Minna Munoz , CCC-A  Clinical Audiologist

## 2018-02-02 ENCOUNTER — HOSPITAL ENCOUNTER (OUTPATIENT)
Dept: RADIOLOGY | Age: 83
Discharge: HOME | End: 2018-02-02
Payer: MEDICARE

## 2018-02-02 DIAGNOSIS — R41.3 MEMORY LOSS: ICD-10-CM

## 2018-02-02 PROCEDURE — 70551 MRI BRAIN STEM W/O DYE: CPT

## 2018-02-03 ENCOUNTER — DOCUMENTATION (OUTPATIENT)
Dept: INTERNAL MEDICINE CLINIC | Facility: CLINIC | Age: 83
End: 2018-02-03

## 2018-02-03 NOTE — PROGRESS NOTES
MRI of the brain shows mild to moderate small vessel disease in age-related changes but otherwise normal

## 2018-02-05 DIAGNOSIS — I10 ESSENTIAL (PRIMARY) HYPERTENSION: ICD-10-CM

## 2018-02-05 DIAGNOSIS — E78.5 HYPERLIPIDEMIA: ICD-10-CM

## 2018-02-05 DIAGNOSIS — N18.30 CHRONIC KIDNEY DISEASE, STAGE III (MODERATE) (HCC): ICD-10-CM

## 2018-02-23 ENCOUNTER — DOCUMENTATION (OUTPATIENT)
Dept: INTERNAL MEDICINE CLINIC | Facility: CLINIC | Age: 83
End: 2018-02-23

## 2018-02-23 ENCOUNTER — TELEPHONE (OUTPATIENT)
Dept: INTERNAL MEDICINE CLINIC | Facility: CLINIC | Age: 83
End: 2018-02-23

## 2018-02-23 NOTE — TELEPHONE ENCOUNTER
HEAD CONGESTION X 4 DAYS  -SINUS HEADACHE  - STUFFY  - COUGHING , DUE TO POST NASAL DRIP      - NO FEVER  - NO ACHING        MED ALLERGIES: LISTED    PATIENT HAS BEEN TALKING CORDAFEDIN OTC

## 2018-02-23 NOTE — PROGRESS NOTES
Patient has developed significant URTI with prominent nasal congestion increasing cough 3rd present for several days and worsening  Has notes significant underlying COPD    Placed on doxycycline 100 milligrams b i d  for 1 week

## 2018-02-26 ENCOUNTER — APPOINTMENT (EMERGENCY)
Dept: RADIOLOGY | Facility: HOSPITAL | Age: 83
End: 2018-02-26
Payer: MEDICARE

## 2018-02-26 ENCOUNTER — HOSPITAL ENCOUNTER (EMERGENCY)
Facility: HOSPITAL | Age: 83
Discharge: HOME | End: 2018-02-26
Attending: EMERGENCY MEDICINE | Admitting: EMERGENCY MEDICINE
Payer: MEDICARE

## 2018-02-26 VITALS
OXYGEN SATURATION: 98 % | TEMPERATURE: 98 F | BODY MASS INDEX: 24.88 KG/M2 | HEIGHT: 69 IN | SYSTOLIC BLOOD PRESSURE: 123 MMHG | DIASTOLIC BLOOD PRESSURE: 63 MMHG | RESPIRATION RATE: 18 BRPM | HEART RATE: 70 BPM | WEIGHT: 168 LBS

## 2018-02-26 DIAGNOSIS — K11.20 SIALADENITIS: Primary | ICD-10-CM

## 2018-02-26 LAB
ALBUMIN SERPL BCP-MCNC: 3.7 G/DL (ref 3.5–5)
ALP SERPL-CCNC: 90 U/L (ref 46–116)
ALT SERPL W P-5'-P-CCNC: 30 U/L (ref 12–78)
ANION GAP BLD CALC-SCNC: 16 MMOL/L (ref 4–13)
ANION GAP SERPL CALCULATED.3IONS-SCNC: 8 MMOL/L (ref 4–13)
AST SERPL W P-5'-P-CCNC: 33 U/L (ref 5–45)
BASOPHILS # BLD AUTO: 0.02 THOUSANDS/ΜL (ref 0–0.1)
BASOPHILS NFR BLD AUTO: 0 % (ref 0–1)
BILIRUB SERPL-MCNC: 1.29 MG/DL (ref 0.2–1)
BUN BLD-MCNC: 25 MG/DL (ref 5–25)
BUN SERPL-MCNC: 22 MG/DL (ref 5–25)
CA-I BLD-SCNC: 1.16 MMOL/L (ref 1.12–1.32)
CALCIUM SERPL-MCNC: 9 MG/DL (ref 8.3–10.1)
CHLORIDE BLD-SCNC: 100 MMOL/L (ref 100–108)
CHLORIDE SERPL-SCNC: 102 MMOL/L (ref 100–108)
CO2 SERPL-SCNC: 24 MMOL/L (ref 21–32)
CREAT BLD-MCNC: 1.4 MG/DL (ref 0.6–1.3)
CREAT SERPL-MCNC: 1.36 MG/DL (ref 0.6–1.3)
CRP SERPL QL: 21.7 MG/L
EOSINOPHIL # BLD AUTO: 0.03 THOUSAND/ΜL (ref 0–0.61)
EOSINOPHIL NFR BLD AUTO: 0 % (ref 0–6)
ERYTHROCYTE [DISTWIDTH] IN BLOOD BY AUTOMATED COUNT: 13.3 % (ref 11.6–15.1)
ERYTHROCYTE [SEDIMENTATION RATE] IN BLOOD: 34 MM/HOUR (ref 0–10)
GFR SERPL CREATININE-BSD FRML MDRD: 46 ML/MIN/1.73SQ M
GFR SERPL CREATININE-BSD FRML MDRD: 48 ML/MIN/1.73SQ M
GLUCOSE SERPL-MCNC: 100 MG/DL (ref 65–140)
GLUCOSE SERPL-MCNC: 91 MG/DL (ref 65–140)
HCT VFR BLD AUTO: 42.7 % (ref 36.5–49.3)
HCT VFR BLD CALC: 44 % (ref 36.5–49.3)
HGB BLD-MCNC: 15 G/DL (ref 12–17)
HGB BLDA-MCNC: 15 G/DL (ref 12–17)
HOLD SPECIMEN: NORMAL
LYMPHOCYTES # BLD AUTO: 0.9 THOUSANDS/ΜL (ref 0.6–4.47)
LYMPHOCYTES NFR BLD AUTO: 12 % (ref 14–44)
MCH RBC QN AUTO: 30.6 PG (ref 26.8–34.3)
MCHC RBC AUTO-ENTMCNC: 35.1 G/DL (ref 31.4–37.4)
MCV RBC AUTO: 87 FL (ref 82–98)
MONOCYTES # BLD AUTO: 1.01 THOUSAND/ΜL (ref 0.17–1.22)
MONOCYTES NFR BLD AUTO: 13 % (ref 4–12)
NEUTROPHILS # BLD AUTO: 5.6 THOUSANDS/ΜL (ref 1.85–7.62)
NEUTS SEG NFR BLD AUTO: 75 % (ref 43–75)
NRBC BLD AUTO-RTO: 0 /100 WBCS
PCO2 BLD: 25 MMOL/L (ref 21–32)
PLATELET # BLD AUTO: 205 THOUSANDS/UL (ref 149–390)
PMV BLD AUTO: 10.8 FL (ref 8.9–12.7)
POTASSIUM BLD-SCNC: 4.4 MMOL/L (ref 3.5–5.3)
POTASSIUM SERPL-SCNC: 4.4 MMOL/L (ref 3.5–5.3)
PROT SERPL-MCNC: 7.7 G/DL (ref 6.4–8.2)
RBC # BLD AUTO: 4.9 MILLION/UL (ref 3.88–5.62)
SODIUM BLD-SCNC: 136 MMOL/L (ref 136–145)
SODIUM SERPL-SCNC: 134 MMOL/L (ref 136–145)
SPECIMEN SOURCE: ABNORMAL
WBC # BLD AUTO: 7.57 THOUSAND/UL (ref 4.31–10.16)

## 2018-02-26 PROCEDURE — 85652 RBC SED RATE AUTOMATED: CPT | Performed by: EMERGENCY MEDICINE

## 2018-02-26 PROCEDURE — 80053 COMPREHEN METABOLIC PANEL: CPT | Performed by: EMERGENCY MEDICINE

## 2018-02-26 PROCEDURE — 71260 CT THORAX DX C+: CPT

## 2018-02-26 PROCEDURE — 86663 EPSTEIN-BARR ANTIBODY: CPT | Performed by: EMERGENCY MEDICINE

## 2018-02-26 PROCEDURE — 86140 C-REACTIVE PROTEIN: CPT | Performed by: EMERGENCY MEDICINE

## 2018-02-26 PROCEDURE — 85025 COMPLETE CBC W/AUTO DIFF WBC: CPT | Performed by: EMERGENCY MEDICINE

## 2018-02-26 PROCEDURE — 80047 BASIC METABLC PNL IONIZED CA: CPT

## 2018-02-26 PROCEDURE — 86665 EPSTEIN-BARR CAPSID VCA: CPT | Performed by: EMERGENCY MEDICINE

## 2018-02-26 PROCEDURE — 99284 EMERGENCY DEPT VISIT MOD MDM: CPT

## 2018-02-26 PROCEDURE — 86664 EPSTEIN-BARR NUCLEAR ANTIGEN: CPT | Performed by: EMERGENCY MEDICINE

## 2018-02-26 PROCEDURE — 85014 HEMATOCRIT: CPT

## 2018-02-26 PROCEDURE — 86308 HETEROPHILE ANTIBODY SCREEN: CPT | Performed by: EMERGENCY MEDICINE

## 2018-02-26 PROCEDURE — 74177 CT ABD & PELVIS W/CONTRAST: CPT

## 2018-02-26 PROCEDURE — 36415 COLL VENOUS BLD VENIPUNCTURE: CPT | Performed by: EMERGENCY MEDICINE

## 2018-02-26 PROCEDURE — 70491 CT SOFT TISSUE NECK W/DYE: CPT

## 2018-02-26 RX ORDER — TAMSULOSIN HYDROCHLORIDE 0.4 MG/1
0.4 CAPSULE ORAL
COMMUNITY

## 2018-02-26 RX ORDER — ACETAMINOPHEN 160 MG
1000 TABLET,DISINTEGRATING ORAL DAILY
COMMUNITY

## 2018-02-26 RX ORDER — DOXYCYCLINE HYCLATE 100 MG/1
100 TABLET, DELAYED RELEASE ORAL 2 TIMES DAILY
COMMUNITY

## 2018-02-26 RX ADMIN — IODIXANOL 100 ML: 320 INJECTION, SOLUTION INTRAVASCULAR at 10:20

## 2018-02-26 NOTE — DISCHARGE INSTRUCTIONS
Sialoadenitis   WHAT YOU NEED TO KNOW:   Sialoadenitis is an inflammation or infection of one or more of your salivary glands  A small stone can block the salivary gland and cause inflammation  Infection may be caused by a virus or bacteria  You can develop sialoadenitis on one or both sides of your face  You may have sialoadenitis once, or it may come back and last a long time  DISCHARGE INSTRUCTIONS:   Manage your sialoadenitis:  It is important to manage your sialoadenitis to help prevent future infections  · Drinking liquids:  Adults should drink about 9 to 13 cups of liquid each day  One cup is 8 ounces  Good choices of liquids for most people include water, juice, and milk  Coffee, soup, and fruit may be counted in your daily liquid amount  Ask your caregiver how much liquid you should drink each day  · Keep your mouth moist:  Suck on hard candy or chew sugarless gum to get your saliva flowing  Sour and tart flavors such as lemon and orange will help get saliva to flow  This will help keep your mouth moist and help push out a stone blocking your salivary duct  · Rinse your mouth:  Use water or mouthwash to clean out pus that may be draining into your mouth  · Massage your jaw:  Massage the area of your swollen gland  This may help relieve swelling and pain by pushing the pus out of the gland  · Apply heat:  Place a warm, moist cloth on the area  Medicines:   · NSAIDs  help decrease swelling and pain or fever  This medicine is available with or without a doctor's order  NSAIDs can cause stomach bleeding or kidney problems in certain people  If you take blood thinner medicine, always ask your healthcare provider if NSAIDs are safe for you  Always read the medicine label and follow directions  · Do not give aspirin to children under 25years of age  Your child could develop Reye syndrome if he takes aspirin  Reye syndrome can cause life-threatening brain and liver damage   Check your child's medicine labels for aspirin, salicylates, or oil of wintergreen  · Pain medicine: You may be given medicine to take away or decrease pain  Do not wait until the pain is severe before you take your medicine  · Antibiotics: This medicine will help fight an infection  You will be given antibiotics if your sialoadenitis is caused by a bacterial infection  Take your antibiotics until they are gone, even if you feel better  · Take your medicine as directed  Contact your healthcare provider if you think your medicine is not helping or if you have side effects  Tell him of her if you are allergic to any medicine  Keep a list of the medicines, vitamins, and herbs you take  Include the amounts, and when and why you take them  Bring the list or the pill bottles to follow-up visits  Carry your medicine list with you in case of an emergency  Follow up with your healthcare provider or ear, nose, and throat specialist as directed:  Write down your questions so you remember to ask them during your visits  Contact your healthcare provider or ear, nose, and throat specialist if:   · The pain and swelling do not go away within 2 days, or they get worse  · Your mouth and eyes are very dry  · You lose movement on one side of your face  · You have questions about your condition or care  Return to the emergency department if:   · You have a fever  · Your salivary gland gets red and hot or drains pus  · You have trouble opening your mouth because of swelling  · You have trouble breathing or swallowing because of swelling  © 2017 [employer] Information is for End User's use only and may not be sold, redistributed or otherwise used for commercial purposes  All illustrations and images included in CareNotes® are the copyrighted property of [employer]  or [employer]  The above information is an  only   It is not intended as medical advice for individual conditions or treatments  Talk to your doctor, nurse or pharmacist before following any medical regimen to see if it is safe and effective for you

## 2018-02-26 NOTE — ED PROVIDER NOTES
History  Chief Complaint   Patient presents with    Neck Swelling     Patient states that he noticed swelling to his neck  Denies any pain  HPI   59-year-old pleasant, well-appearing male with history of bladder cancer, CAD, BPH, HLD, HTN, and COPD presents to the emergency department for evaluation of bilateral submandibular swelling  Patient states that he first noticed mild bilateral submandibular swelling yesterday and a "funny feeling" in his tongue  Swelling has since progressed, but he has not developed any other symptoms such as fevers, dysphagia, odynphagia, stridor, shortness of breath, sore throat, or pain  He denies having had similar swelling in the past and has not noted any modifying factors for the swelling  On ROS, patient denies any recent night sweats, weight loss, chest pain, abdominal pain, nausea, vomiting, change in urinary/bowel function, or swelling elsewhere  Possibly of note, patient was started on doxycycline 3 days ago for URI symptoms (nasal congestion, cough, sinus pressure)  Symptoms have since improved  Prior to Admission Medications   Prescriptions Last Dose Informant Patient Reported? Taking? Omega-3 Fatty Acids (FISH OIL) 1,000 mg 2/26/2018 at Unknown time  Yes Yes   Sig: Take 1,000 mg by mouth daily  albuterol (PROVENTIL HFA,VENTOLIN HFA) 90 mcg/act inhaler 2/26/2018 at Unknown time  Yes Yes   Sig: Inhale 2 puffs every 6 (six) hours as needed for wheezing  amLODIPine (NORVASC) 5 mg tablet 2/26/2018 at Unknown time  Yes Yes   Sig: Take 5 mg by mouth daily  aspirin 81 MG tablet 2/26/2018 at Unknown time  Yes Yes   Sig: Take 81 mg by mouth daily  atorvastatin (LIPITOR) 80 mg tablet 2/26/2018 at Unknown time  Yes Yes   Sig: Take 80 mg by mouth daily  budesonide-formoterol (SYMBICORT) 160-4 5 mcg/act inhaler 2/26/2018 at Unknown time  Yes Yes   Sig: Inhale 2 puffs 2 (two) times a day     cholecalciferol (VITAMIN D3) 1,000 units tablet 2/26/2018 at Unknown time Yes Yes   Sig: Take 1,000 Units by mouth daily   doxycycline (DORYX) 100 MG EC tablet 2/26/2018 at Unknown time  Yes Yes   Sig: Take 100 mg by mouth 2 (two) times a day   ezetimibe (ZETIA) 10 mg tablet 2/26/2018 at Unknown time  Yes Yes   Sig: Take 10 mg by mouth daily  metoprolol succinate (TOPROL-XL) 25 mg 24 hr tablet 2/26/2018 at Unknown time  Yes Yes   Sig: Take 25 mg by mouth daily  montelukast (SINGULAIR) 10 mg tablet 2/26/2018 at Unknown time  Yes Yes   Sig: Take 10 mg by mouth daily at bedtime  tamsulosin (FLOMAX) 0 4 mg 2/25/2018 at Unknown time  Yes Yes   Sig: Take 0 4 mg by mouth daily with dinner   tiotropium (SPIRIVA) 18 mcg inhalation capsule 2/26/2018 at Unknown time  Yes Yes   Sig: Place 18 mcg into inhaler and inhale daily  Facility-Administered Medications: None       Past Medical History:   Diagnosis Date    Arthritis     Bladder cancer ([facility] [address] )     BPH (benign prostatic hyperplasia)     Coronary artery disease     Hyperlipidemia     Hypertension        Past Surgical History:   Procedure Laterality Date    CARDIOVASCULAR STRESS TEST  03/2009    COLONOSCOPY  2012    CORONARY ARTERY BYPASS GRAFT      x 3    CYSTOSCOPY  01/29/2015    diagnostic    MN CYSTOURETHROSCOPY,BIOPSY N/A 5/4/2016    Procedure: Gabo Beasley; BLADDER BIOPSY WITH Jose Antonio Ross ;  Surgeon: Jg Sheppard MD;  [facility];  Service: Urology    MN CYSTOURETHROSCOPY,FULGUR 0 5-2 CM LESN N/A 5/4/2016    Procedure: TUR BLADDER TUMOR ;  Surgeon: Jg Sheppard MD;  [facility];  Service: Urology    TONSILLECTOMY  1939       Family History   Problem Relation Age of Onset    Coronary artery disease Mother     Hyperlipidemia Mother     Hyperlipidemia Father     Coronary artery disease Family     Hyperlipidemia Family     Hypertension Family     Osteoarthritis Family      I have reviewed and agree with the history as documented      Social History   Substance Use Topics    Smoking status: Former Smoker     Types: Cigarettes     Quit date: 1993    Smokeless tobacco: Never Used      Comment: smoked for 50 years a ppd    Alcohol use Yes      Comment: social         Review of Systems   Constitutional: Negative for chills and fever  Respiratory: Negative for shortness of breath  Cardiovascular: Negative for chest pain  Gastrointestinal: Negative for abdominal pain, nausea and vomiting  Musculoskeletal: Negative for back pain  Skin: Negative for rash and wound  Allergic/Immunologic: Negative for immunocompromised state  Neurological: Negative for headaches  Psychiatric/Behavioral: The patient is not nervous/anxious  All other systems reviewed and are negative  Physical Exam  ED Triage Vitals   Temperature Pulse Respirations Blood Pressure SpO2   02/26/18 0736 02/26/18 0736 02/26/18 0736 02/26/18 0736 02/26/18 0736   98 °F (36 7 °C) (!) 44 18 (!) 158/116 97 %      Temp Source Heart Rate Source Patient Position - Orthostatic VS BP Location FiO2 (%)   02/26/18 0736 02/26/18 0736 02/26/18 0925 02/26/18 1025 --   Oral Monitor Sitting Right arm       Pain Score       02/26/18 0736       No Pain           Orthostatic Vital Signs  Vitals:    02/26/18 0757 02/26/18 0925 02/26/18 1025 02/26/18 1125   BP: (!) 232/125 168/92 126/70 123/63   Pulse:  (!) 48 71 70   Patient Position - Orthostatic VS:  Sitting         Physical Exam   Constitutional: He is oriented to person, place, and time  He appears well-nourished  No distress  HENT:   Head: Normocephalic and atraumatic  Eyes: EOM are normal    Neck: Normal range of motion, full passive range of motion without pain and phonation normal  Neck supple  No tracheal tenderness present  No tracheal deviation present  No thyroid mass present  Cardiovascular: Normal rate and regular rhythm  Pulmonary/Chest: Effort normal and breath sounds normal  No respiratory distress  Abdominal: Soft  He exhibits no distension  There is no tenderness  Musculoskeletal: Normal range of motion  Neurological: He is alert and oriented to person, place, and time  Skin: Skin is warm and dry  He is not diaphoretic  Psychiatric: He has a normal mood and affect  His behavior is normal    Nursing note and vitals reviewed  ED Medications  Medications   iodixanol (VISIPAQUE) 320 MG/ML injection 100 mL (100 mL Intravenous Given 2/26/18 1020)       Diagnostic Studies  Results Reviewed     Procedure Component Value Units Date/Time    EBV acute panel [[id number]]  (Abnormal) Collected:  02/26/18 0918    Lab Status:  Final result Specimen:  Blood from Arm, Right Updated:  02/27/18 2008     EBV Early Antigen Ab, IgG >150 0 (H) U/mL      EBV VCA IgG >600 0 (H) U/mL      EBV VCA IgM <36 0 U/mL      EBV Nuclear Ag Ab 100 0 (H) U/mL      EBV Interp  Comment    Narrative:       Performed at:  [address] [place] [address]  [id number]  : Issac Isbell MD, Phone:  [phone]    Mononucleosis screen [[id number]]  (Normal) Collected:  02/26/18 0918    Lab Status:  Final result Specimen:  Blood from Arm, Right Updated:  02/27/18 0759     Monotest Negative    Sedimentation rate, automated [[id number]]  (Abnormal) Collected:  02/26/18 0918    Lab Status:  Final result Specimen:  Blood from Arm, Right Updated:  02/26/18 1114     Sed Rate 34 (H) mm/hour     [place] draw [[id number]] Collected:  02/26/18 0918    Lab Status: In process Specimen:  Blood Updated:  02/26/18 1101    Narrative: The following orders were created for panel order [place] draw    Procedure                               Abnormality         Status                     ---------                               -----------         ------                     Sayda Agent Top on HONY[[id number]]                            Final result               Gold top on XTQS[[id number]]                                                             Green / Yellow tube on IIGS[[id number]] Final result               Green / Black tube on DQVD[[id number]]                        Final result               Lavender Top 3 ml on UDMS[[id number]]                         Final result               Lavender Top 7ml on QKDI[[id number]]                          In process                   Please view results for these tests on the individual orders  Comprehensive metabolic panel [[id number]]  (Abnormal) Collected:  02/26/18 0918    Lab Status:  Final result Specimen:  Blood from Arm, Right Updated:  02/26/18 0953     Sodium 134 (L) mmol/L      Potassium 4 4 mmol/L      Chloride 102 mmol/L      CO2 24 mmol/L      Anion Gap 8 mmol/L      BUN 22 mg/dL      Creatinine 1 36 (H) mg/dL      Glucose 91 mg/dL      Calcium 9 0 mg/dL      AST 33 U/L      ALT 30 U/L      Alkaline Phosphatase 90 U/L      Total Protein 7 7 g/dL      Albumin 3 7 g/dL      Total Bilirubin 1 29 (H) mg/dL      eGFR 48 ml/min/1 73sq m     Narrative:         National Kidney Disease Education Program recommendations are as follows:  GFR calculation is accurate only with a steady state creatinine  Chronic Kidney disease less than 60 ml/min/1 73 sq  meters  Kidney failure less than 15 ml/min/1 73 sq  meters      C-reactive protein [[id number]]  (Abnormal) Collected:  02/26/18 0918    Lab Status:  Final result Specimen:  Blood from Arm, Right Updated:  02/26/18 0953     CRP 21 7 (H) mg/L     CBC and differential [[id number]]  (Abnormal) Collected:  02/26/18 0918    Lab Status:  Final result Specimen:  Blood from Arm, Right Updated:  02/26/18 0939     WBC 7 57 Thousand/uL      RBC 4 90 Million/uL      Hemoglobin 15 0 g/dL      Hematocrit 42 7 %      MCV 87 fL      MCH 30 6 pg      MCHC 35 1 g/dL      RDW 13 3 %      MPV 10 8 fL      Platelets 578 Thousands/uL      nRBC 0 /100 WBCs      Neutrophils Relative 75 %      Lymphocytes Relative 12 (L) %      Monocytes Relative 13 (H) %      Eosinophils Relative 0 %      Basophils Relative 0 %      Neutrophils Absolute 5 60 Thousands/µL      Lymphocytes Absolute 0 90 Thousands/µL      Monocytes Absolute 1 01 Thousand/µL      Eosinophils Absolute 0 03 Thousand/µL      Basophils Absolute 0 02 Thousands/µL     POCT Chem 8+ [[id number]]  (Abnormal) Collected:  02/26/18 0902    Lab Status:  Final result Updated:  02/26/18 0908     SODIUM, I-STAT 136 mmol/l      Potassium, i-STAT 4 4 mmol/L      Chloride, istat 100 mmol/L      CO2, i-STAT 25 mmol/L      Anion Gap, Istat 16 (H) mmol/L      Calcium, Ionized i-STAT 1 16 mmol/L      BUN, I-STAT 25 mg/dl      Creatinine, i-STAT 1 4 (H) mg/dl      eGFR 46 ml/min/1 73sq m      Glucose, i-STAT 100 mg/dl      Hct, i-STAT 44 %      Hgb, i-STAT 15 0 g/dl      Specimen Type VENOUS                 CT chest abdomen pelvis w contrast   Final Result by Ed Valenzuela DO (02/26 1051)      No acute findings in the chest, abdomen or pelvis  Waxing and waning of probable small hepatic cysts  Right renal atrophy and scarring  Probable 8 mm hemorrhagic or proteinaceous cyst right midpole, overall appears minimally smaller although slightly more dense  Consider follow-up ultrasound in 6-12 months  Chronic appearing thickening of the urinary bladder walls  If relevant, this can be best evaluated with direct visualization  Stable 3 7 cm abdominal aortic aneurysm and 2 5 cm left common iliac artery aneurysm  Prostatomegaly  CT neck will be reported separately  Workstation performed: [id number]         CT soft tissue neck with contrast   Final Result by Kodi Hayes DO (02/26 1047)      Slight increased enhancement of the submandibular glands bilaterally  Both glands appear edematous with no evidence of sialolithiasis  Findings may represent sialoadenitis  Mild edema and stranding within the adjacent fat of the submandibular space    and subcutaneous fat below the superficial fascia may represent reactive cellulitis  No abscess formation    Mild reactive submandibular and submental adenopathy  Moderate diffuse paranasal sinus mucosal thickening with prior funduscopic sinus surgery  There is fluid layering in the posterior maxillary sinuses bilaterally suggesting an acute component of sinusitis  Workstation performed: [id number]               Procedures  Procedures      Phone Consults  ED Phone Contact    ED Course  ED Course as of Mar 01 2002   Mon Feb 26, 2018   0941 Monocytes Relative: (!) 13   0958 baseline Creatinine: (!) 1 36   1018 C-REACTIVE PROTEIN: (!) 21 7   1052 Slight increased enhancement of the submandibular glands bilaterally   Both glands appear edematous with no evidence of sialolithiasis   Findings may represent sialoadenitis   Mild edema and stranding within the adjacent fat of the submandibular space and subcutaneous fat below the superficial fascia may represent reactive cellulitis   No abscess formation   Mild reactive submandibular and submental adenopathy  [id number] [phone] Discussed with ENT - okay for discharge with outpatient follow up  Nothing to do if asx  MDM  Number of Diagnoses or Management Options  Sialadenitis:   Diagnosis management comments: 49-year-old male with acute bilateral submandibular swelling, unclear etiology  Will obtain labs, CT soft tissue neck, CT CAP (given hx cancer), and dispo accordingly  CritCare Time    Disposition  Final diagnoses:   Sialadenitis     Time reflects when diagnosis was documented in both MDM as applicable and the Disposition within this note     Time User Action Codes Description Comment    2/26/2018 11:32 AM Doni Mead Add [K11 20] Sialadenitis       ED Disposition     ED Disposition Condition Comment    Discharge  Stephanie Maloney discharge to home/self care      Condition at discharge: Good        Follow-up Information     Follow up With Specialties Details Why Peyton Mariscal MD Otolaryngology Schedule an appointment as soon as possible for a visit  [address] [place] [address]          Discharge Medication List as of 2/26/2018 11:33 AM      CONTINUE these medications which have NOT CHANGED    Details   albuterol (PROVENTIL HFA,VENTOLIN HFA) 90 mcg/act inhaler Inhale 2 puffs every 6 (six) hours as needed for wheezing , Until Discontinued, Historical Med      amLODIPine (NORVASC) 5 mg tablet Take 5 mg by mouth daily  , Until Discontinued, Historical Med      aspirin 81 MG tablet Take 81 mg by mouth daily  , Until Discontinued, Historical Med      atorvastatin (LIPITOR) 80 mg tablet Take 80 mg by mouth daily  , Until Discontinued, Historical Med      budesonide-formoterol (SYMBICORT) 160-4 5 mcg/act inhaler Inhale 2 puffs 2 (two) times a day , Until Discontinued, Historical Med      cholecalciferol (VITAMIN D3) 1,000 units tablet Take 1,000 Units by mouth daily, Historical Med      doxycycline (DORYX) 100 MG EC tablet Take 100 mg by mouth 2 (two) times a day, Historical Med      ezetimibe (ZETIA) 10 mg tablet Take 10 mg by mouth daily  , Until Discontinued, Historical Med      metoprolol succinate (TOPROL-XL) 25 mg 24 hr tablet Take 25 mg by mouth daily  , Until Discontinued, Historical Med      montelukast (SINGULAIR) 10 mg tablet Take 10 mg by mouth daily at bedtime  , Until Discontinued, Historical Med      Omega-3 Fatty Acids (FISH OIL) 1,000 mg Take 1,000 mg by mouth daily  , Until Discontinued, Historical Med      tamsulosin (FLOMAX) 0 4 mg Take 0 4 mg by mouth daily with dinner, Historical Med      tiotropium (SPIRIVA) 18 mcg inhalation capsule Place 18 mcg into inhaler and inhale daily  , Until Discontinued, Historical Med           No discharge procedures on file  ED Provider  Attending physically available and evaluated George Gamboa  LAUREN managed the patient along with the ED Attending      Electronically Signed by         Andrea Moses MD  03/01/18 2002

## 2018-02-27 LAB
EBV EA IGG SER-ACNC: >150 U/ML (ref 0–8.9)
EBV NA IGG SER IA-ACNC: 100 U/ML (ref 0–17.9)
EBV PATRN SPEC IB-IMP: ABNORMAL
EBV VCA IGG SER IA-ACNC: >600 U/ML (ref 0–17.9)
EBV VCA IGM SER IA-ACNC: <36 U/ML (ref 0–35.9)
HETEROPH AB SER QL: NEGATIVE

## 2018-03-01 ENCOUNTER — TELEPHONE (OUTPATIENT)
Dept: INTERNAL MEDICINE CLINIC | Facility: CLINIC | Age: 83
End: 2018-03-01

## 2018-03-01 NOTE — TELEPHONE ENCOUNTER
----- Message from Sharon Christianson MD sent at 3/1/2018  1:11 PM EST -----  This patient was recently into the ER on February the 26-call and see how he is doing-if he is having issues should have follow-up appointment-trying schedule end of day for March 6

## 2018-03-01 NOTE — TELEPHONE ENCOUNTER
JUNE CALLED BACK, MARIA ELENA SAW DR BELTRAN WHO SAID THAT HE HAD TWO BLOCKED SALIVARY GLANDS   SHE SAID THAT HE UNBLOCKED THEM AND NOW JUST ABOUT ALL OF THE SWELLING HAS GONE DOWN SO SHE DOESN'T THINK HE NEEDS TO BE SEEN SOONER THAN HIS UP COMING APPT

## 2018-03-01 NOTE — TELEPHONE ENCOUNTER
I CALLED Pt, HE PUT ME ON THE PHONE WITH HIS DAUGHTER JUNE  SHE SAID THAT THE SWELLING IN HIS NECK HASN'T REALLY GONE DOWN AND THEY ARE AT [facility] RIGHT NOW  SHE STATED THAT SHE WANTED TO WAIT TO SEE WHAT THE DR SAID (Mina Burrell Dr) BEFORE SHE TOOK THE APPT ON THE 6TH   JUNE SAID THAT SHE'D CALL ME BACK TO LET ME KNOW

## 2018-03-02 ENCOUNTER — DOCUMENTATION (OUTPATIENT)
Dept: INTERNAL MEDICINE CLINIC | Facility: CLINIC | Age: 83
End: 2018-03-02

## 2018-03-02 NOTE — PROGRESS NOTES
Patient was into the ER with swelling of the neck  ER notes it been reviewed    We are going to see him in follow-up but he went to see Edna Mcrae and was told he  had 2 blocked salivary glands-these were treated and the patient is  doing well and defers on further her up

## 2018-03-09 ENCOUNTER — APPOINTMENT (OUTPATIENT)
Dept: LAB | Age: 83
End: 2018-03-09
Payer: MEDICARE

## 2018-03-09 ENCOUNTER — TRANSCRIBE ORDERS (OUTPATIENT)
Dept: ADMINISTRATIVE | Age: 83
End: 2018-03-09

## 2018-03-09 DIAGNOSIS — I10 ESSENTIAL (PRIMARY) HYPERTENSION: ICD-10-CM

## 2018-03-09 DIAGNOSIS — N18.30 CHRONIC KIDNEY DISEASE, STAGE III (MODERATE) (HCC): ICD-10-CM

## 2018-03-09 DIAGNOSIS — E78.5 HYPERLIPIDEMIA: ICD-10-CM

## 2018-03-09 LAB
ALBUMIN SERPL BCP-MCNC: 3.9 G/DL (ref 3.5–5)
ALP SERPL-CCNC: 88 U/L (ref 46–116)
ALT SERPL W P-5'-P-CCNC: 34 U/L (ref 12–78)
ANION GAP SERPL CALCULATED.3IONS-SCNC: 9 MMOL/L (ref 4–13)
AST SERPL W P-5'-P-CCNC: 21 U/L (ref 5–45)
BILIRUB SERPL-MCNC: 0.75 MG/DL (ref 0.2–1)
BUN SERPL-MCNC: 30 MG/DL (ref 5–25)
CALCIUM SERPL-MCNC: 9.3 MG/DL (ref 8.3–10.1)
CHLORIDE SERPL-SCNC: 107 MMOL/L (ref 100–108)
CHOLEST SERPL-MCNC: 113 MG/DL (ref 50–200)
CO2 SERPL-SCNC: 25 MMOL/L (ref 21–32)
CREAT SERPL-MCNC: 1.45 MG/DL (ref 0.6–1.3)
CREAT UR-MCNC: 39.6 MG/DL
GFR SERPL CREATININE-BSD FRML MDRD: 44 ML/MIN/1.73SQ M
GLUCOSE P FAST SERPL-MCNC: 95 MG/DL (ref 65–99)
HDLC SERPL-MCNC: 47 MG/DL (ref 40–60)
LDLC SERPL DIRECT ASSAY-MCNC: 57 MG/DL (ref 0–100)
POTASSIUM SERPL-SCNC: 4.3 MMOL/L (ref 3.5–5.3)
PROT SERPL-MCNC: 7.3 G/DL (ref 6.4–8.2)
PROT UR-MCNC: 7 MG/DL
PROT/CREAT UR: 0.18 MG/G{CREAT} (ref 0–0.1)
SODIUM SERPL-SCNC: 141 MMOL/L (ref 136–145)
TRIGL SERPL-MCNC: 71 MG/DL

## 2018-03-09 PROCEDURE — 82570 ASSAY OF URINE CREATININE: CPT

## 2018-03-09 PROCEDURE — 80053 COMPREHEN METABOLIC PANEL: CPT

## 2018-03-09 PROCEDURE — 36415 COLL VENOUS BLD VENIPUNCTURE: CPT

## 2018-03-09 PROCEDURE — 80061 LIPID PANEL: CPT

## 2018-03-09 PROCEDURE — 83721 ASSAY OF BLOOD LIPOPROTEIN: CPT

## 2018-03-09 PROCEDURE — 84156 ASSAY OF PROTEIN URINE: CPT

## 2018-03-11 PROBLEM — R41.0 CONFUSION: Status: ACTIVE | Noted: 2017-11-20

## 2018-03-11 PROBLEM — R41.0 CONFUSION: Chronic | Status: ACTIVE | Noted: 2017-11-20

## 2018-03-11 PROBLEM — E55.9 VITAMIN D DEFICIENCY: Status: ACTIVE | Noted: 2017-02-15

## 2018-03-11 PROBLEM — E55.9 VITAMIN D DEFICIENCY: Chronic | Status: ACTIVE | Noted: 2017-02-15

## 2018-03-12 ENCOUNTER — OFFICE VISIT (OUTPATIENT)
Dept: INTERNAL MEDICINE CLINIC | Facility: CLINIC | Age: 83
End: 2018-03-12
Payer: MEDICARE

## 2018-03-12 VITALS
RESPIRATION RATE: 14 BRPM | HEIGHT: 69 IN | WEIGHT: 176.6 LBS | SYSTOLIC BLOOD PRESSURE: 128 MMHG | BODY MASS INDEX: 26.16 KG/M2 | HEART RATE: 72 BPM | DIASTOLIC BLOOD PRESSURE: 80 MMHG

## 2018-03-12 DIAGNOSIS — N40.0 BENIGN PROSTATIC HYPERPLASIA, UNSPECIFIED WHETHER LOWER URINARY TRACT SYMPTOMS PRESENT: Chronic | ICD-10-CM

## 2018-03-12 DIAGNOSIS — I25.10 CORONARY ARTERY DISEASE INVOLVING NATIVE HEART WITHOUT ANGINA PECTORIS, UNSPECIFIED VESSEL OR LESION TYPE: Chronic | ICD-10-CM

## 2018-03-12 DIAGNOSIS — E78.2 MIXED HYPERLIPIDEMIA: Chronic | ICD-10-CM

## 2018-03-12 DIAGNOSIS — N18.30 CHRONIC KIDNEY DISEASE, STAGE 3 (HCC): Chronic | ICD-10-CM

## 2018-03-12 DIAGNOSIS — R41.0 CONFUSION: ICD-10-CM

## 2018-03-12 DIAGNOSIS — I10 ESSENTIAL HYPERTENSION: Primary | Chronic | ICD-10-CM

## 2018-03-12 DIAGNOSIS — C67.9 MALIGNANT NEOPLASM OF URINARY BLADDER, UNSPECIFIED SITE (HCC): Chronic | ICD-10-CM

## 2018-03-12 DIAGNOSIS — R22.1 NECK SWELLING: ICD-10-CM

## 2018-03-12 PROCEDURE — 99215 OFFICE O/P EST HI 40 MIN: CPT | Performed by: INTERNAL MEDICINE

## 2018-03-12 RX ORDER — LATANOPROST 50 UG/ML
SOLUTION/ DROPS OPHTHALMIC
COMMUNITY

## 2018-03-12 RX ORDER — AMPICILLIN TRIHYDRATE 250 MG
CAPSULE ORAL
COMMUNITY

## 2018-03-12 NOTE — PROGRESS NOTES
Assessment/Plan:  1  Episode of confusion lasting only seconds-no associated headache or focal neurologic symptoms and has not recurred  Carotid Doppler shows less than 50% stenosis bilaterally  MRI of the brain shows mild to moderate small vessel disease  All labs stable other than mild elevation of his sed rate at 33  Has not had any recurrent neurologic symptoms and therefore monitoring  2  Recent episode of neck swelling-occurred after respiratory tract infection-likely viral in are gin  Angelo Bar virus panel shows old disease  He had manipulation of the glands by ENT but no other treatment and is overall much improved  3  Abdominal aortic aneurysm-noted on recent CT scan at 3 7 centimeters-monitor-CT scan was done in February 2018  4  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-generally surgical repairs recommended after 3 centimeters in patients that are not high operative risk-at this point the monitoring  5  Elevated sed rate-no other sed rates over the past couple years  Again recently elevated at associated with the viral syndrome-will be rechecking in the future  6  Hypertension-adequate control on current dose of beta-blocker and amlodipine  7  COPD-stable at baseline  As noted most recent PFT showed mild disease  He remains on triple therapy for his COPD-asthma   8-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  Most recently had intravesicular Valrubicin-most recent cystoscopy continues to show some disease  The patient prefers to continue intravesicular therapy rather than cystectomy which is recommended by surgery  Urology feels that his disease is likely to progress if he does not have cystectomy but he refuses   He is being seen again in [place] over the next month   #9 utiadgycxxdkhb-cukspvfu-oqsekrctofrs-echo done in March of this year shows mild LVH, EF mildly impaired with mild diastolic dysfunction mild left atrial enlargement-unchanged from September 2015  He recently saw cardiology who made no change in his meds other than decreasing his dose of Lipitor which I agree wit   #10 hyperlipidemia-treat aggressively with his known CAD-LDL remains low and under 50 and as noted cardiology decreased his Lipitor from 80 down to 40 mg annabelle   #11 previously noted flank pain-resolved-CAT scan of abdomen showed perinephric stranding on the right with mild prominence of the right renal collecting system-was treated for potential UTI symptoms resolved   #12 angioedema-was on Cipro as well as ACE inhibitor one recurred-he had angioedema in the past from ACE inhibitor knows to avoid these agen  13 chronic renal failure-stage III-creatinine i prior to this visit stable at 1 45 Prior ultrasound shows no obstruction  Rule out underlying nephrosclerosis and/or renal artery stenosis  Could easily be influenced by his bladder disease with some degree of obstruction  He knows to avoid nonsteroidals  He knows to avoid radiographic dye  Previously he had mild elevation of his protein creatinine ratio at 0 42  As noted now CT scan suggests some degree right renal atrophy  Urine for protein creatinine ratio improved to 0 18-monitoring closely    #14asymptomatic carotid stenosis test less than 50% bilaterally  Consider follow-up carotid Doppler over the next couple visits #15 gout-asymptomatic times months   #16 CAD-had a CABG in 1994  Has known old inferior infarct  Nuclear stress test in May 2012 shows 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 MI with some degree of ischemia with low normal systolic function  Myoview in October 2014 shows old inferior MI, no ischemia with minimal decrease in his EF  Was on an ACE inhibitor but this was stopped because of angioedema  Not using angiotensin receptor blockers because of elevated creatinine   Recently saw cardiology felt to be stable   #17 mild hyperglycemia with normal hemoglobin A1c    #18 vitamin D deficiency-begin 2000 units daily        All other problems as per note of October 2001, June 2001   2      MEDICAL REGIMEN: Atorvastatin 80 MGs-half tab daily, Symbicort 160/4 5 taken as 2 puffs twice a day, Spiriva one dose daily, Singulair 10 mg daily, metoprolol succinate 25 mg daily, Claritin 10 mg daily as needed, baby aspirin daily, amlodipine 5 mg daily, Zetia 10 MGs-half tab daily, omega-3 fish oil-2000 mg a day , vitamin D 3/2000 units daily, Flomax 0 4 milligrams daily      Addendum-spoke with patient's daughter on Marcelo Katie the 24-he is having progressive disease  Has at 3+ centimeter abdominal aneurysm-on CT scan has atrophy of kidney-bladder carcinoma is progressing  They met with Oncology-they were considering combination oncology radiation oncologist feels he is not a candidate for chemo  Radiation therapy is said he would o do not feel he can be treated unless he has hematemesis chemo-family feels he cannot handle a cystectomy  As far as operative risk as well as handling the results of the cystectomy He is this point being considered for palliative care       appointment in several months with prior chemistry profile, cholesterol profile, CBC  No problem-specific Assessment & Plan notes found for this encounter  Diagnoses and all orders for this visit:    Essential hypertension  -     CBC and differential; Future  -     Comprehensive metabolic panel; Future  -     Cholesterol, total; Future  -     Triglycerides; Future  -     HDL cholesterol; Future  -     LDL cholesterol, direct; Future    Coronary artery disease involving native heart without angina pectoris, unspecified vessel or lesion type    Confusion    Malignant neoplasm of urinary bladder, unspecified site (HCC)    Benign prostatic hyperplasia, unspecified whether lower urinary tract symptoms present    Chronic kidney disease, stage 3    Mixed hyperlipidemia  -     CBC and differential; Future  -     Comprehensive metabolic panel;  Future  -     Cholesterol, total; Future  - Triglycerides; Future  -     HDL cholesterol; Future  -     LDL cholesterol, direct; Future    Neck swelling    Other orders  -     Coenzyme Q10 (COQ10) 200 MG CAPS; Take by mouth  -     Misc Natural Products (GLUCOSAMINE CHONDROITIN ADV PO); Take 1 tablet by mouth daily  -     latanoprost (XALATAN) 0 005 % ophthalmic solution; Apply to eye          Subjective:      Patient ID: Meghana Amaral is a 80 y o  male  Reviewed several issues today  He had a recent episode of relatively abrupt onset of neck swelling  He had had upper respiratory tract infection with potential exacerbation of his COPD was treated with doxycycline  He was noted to have submandibular swelling  He went to the ER  He ventrally went to the ENT  He had a CT scan of his neck as well as serologies  He also had CT scan of abdomen pelvis  CT scan of neck was read as showing slight enhancement of the submandibular glands bilaterally-they appeared edematous without any evidence of stones  He also had moderate diffuse sign 0 mucosal thickening with fluid layering in the posterior maxillary sinuses bilaterally suggesting some component of sinusitis  He had a CT scan of his abdomen and pelvis  There was no evidence of lymphoma  He had right renal atrophy and scarring and probable 8 millimeter hemorrhagic or proteinaceous cyst mid pole  This was smaller although slightly more dense than prior study  He had a stable 3 7 centimeter abdominal aortic aneurysm and 2 5 centimeter left common iliac artery aneurysm  Because of his prior neurologic issue he had undergone MRI of his brain  We reviewed that  He has spoke with the patient about-previously  MRI of the brain showed mild-to-moderate small-vessel disease, diffuse sinus disease no other issues  Recent serologies ordered by ENT show evidence of old Angelo Bar virus, CBC normal without increase in atypical lymphocytes, chemistry profile shows elevated creatinine which is stable at 1 36  Mild spot negative sed rate was 34-note 2 months ago was 33 C-reactive protein was elevated at 22  Day urine for protein creatinine ratio ordered recently which showed 0 18 as opposed 2 42 year ago  Cholesterol was 113 HDL 47 LDL 57 triglycerides 71 BUN 30 with a creatinine of 1 45  At the time of his neck swelling he did not have severe sore throat  He did not have any difficulty swallowing  It did not affect respiration  We reviewed the difference on serology of Angelo Stallings as acute versus old disease  He has known hypertension  BP adequately controlled on current regimen  Avoiding salt and decongestants  Denies hematemesis pounding of his heart sweats and headache  Preferred blood pressure under 130/80           This patient has known chronic renal failure  We reviewed the difference between acute renal failure, chronic renal failure and exacerbations of chronic renal failure  Nonsteroidal anti-inflammatories are contraindicated because of their potential for exacerbating this condition  This patient has to be very careful with the use of any radiographic dye and knows that if it is needed there may need to be additional treatment at the time of the x-ray procedure  We also reviewed about avoidance of extraneous phosphorus supplements  We discussed the influence of chronic renal failure on metabolism of various medications and that dose adjustments often have to be made based on the status and severity of the chronic renal failure  Appropriate volume status is always important and needs to be monitored on a chronic basis  In addition ,we also reviewed that certain medications including ACE inhibitors and angiotensin receptor blockers can exacerbate this condition and are potentially contraindicated and/or may need dose adjustments  Patients with chronic renal failure need aggressive control of blood pressure will preferred values of 125-130/75-80    We also discussed that chronic renal failure can contribute to exacerbation of atherosclerosis  As noted CT shows some degree of atrophy  This was a 40 minutes visit  More than 50% of the time was spent counseling the patient formulating a treatment plan  Multiple questions were answered        The following portions of the patient's history were reviewed and updated as appropriate: current medications, past family history, past medical history, past social history, past surgical history and problem list     Review of Systems   Constitutional: Negative  HENT:        Recent episode of swelling of the submandibular glands   Respiratory: Positive for shortness of breath  Cardiovascular: Negative  Gastrointestinal: Negative  Endocrine: Negative  Genitourinary: Negative  Nocturia x2   Musculoskeletal: Positive for arthralgias  Neurological: Negative  Hematological: Negative  Psychiatric/Behavioral: Negative  Objective:      /80   Pulse 72   Resp 14   Ht 5' 9" (1 753 m)   Wt 80 1 kg (176 lb 9 6 oz)   BMI 26 08 kg/m²          Physical Exam   Constitutional: He is oriented to person, place, and time  He appears well-developed and well-nourished  No distress  HENT:   Head: Normocephalic and atraumatic  Right Ear: External ear normal    Left Ear: External ear normal    Nose: Nose normal    Mouth/Throat: Oropharynx is clear and moist  No oropharyngeal exudate  Eyes: Conjunctivae and EOM are normal  Pupils are equal, round, and reactive to light  Right eye exhibits no discharge  Left eye exhibits no discharge  No scleral icterus  Neck: Normal range of motion  Neck supple  No JVD present  No tracheal deviation present  No thyromegaly present  Prominent submandibular glands bilaterally   Cardiovascular: Normal rate, regular rhythm, normal heart sounds and intact distal pulses  Exam reveals no gallop and no friction rub  No murmur heard    Pulmonary/Chest: Effort normal and breath sounds normal  No stridor  No respiratory distress  He has no wheezes  He has no rales  He exhibits no tenderness  Increased tympany to percussion   Abdominal: Soft  Bowel sounds are normal  He exhibits no distension and no mass  There is no tenderness  There is no rebound and no guarding  Genitourinary: Rectal exam shows guaiac negative stool  Musculoskeletal: Normal range of motion  He exhibits no edema, tenderness or deformity  Lymphadenopathy:     He has no cervical adenopathy  Neurological: He is alert and oriented to person, place, and time  He has normal reflexes  He displays normal reflexes  No cranial nerve deficit  He exhibits normal muscle tone  Coordination normal    Skin: Skin is warm and dry  No rash noted  He is not diaphoretic  No erythema  No pallor  Benign keratoses sees   Psychiatric: He has a normal mood and affect   His behavior is normal  Judgment and thought content normal

## 2018-03-13 NOTE — ED ATTENDING ATTESTATION
Verner Patches, MD, saw and evaluated the patient  I have discussed the patient with the resident/non-physician practitioner and agree with the resident's/non-physician practitioner's findings, Plan of Care, and MDM as documented in the resident's/non-physician practitioner's note, except where noted  All available labs and Radiology studies were reviewed  At this point I agree with the current assessment done in the Emergency Department  I have conducted an independent evaluation of this patient a history and physical is as follows:    Patient presents with 1 day of bilateral neck swelling under his mandible  Patient states that yesterday he noticed some mild swelling and had a funny sensation in his tongue  Since that time, the swelling has increased  He denies any pain, fevers, difficulty swallowing, difficulty breathing, dental pain, or swelling inside of his mouth  Patient denies any other locations of swelling or prior episode of same  Patient has been on doxycycline for 3 days due to URI symptoms  No additional complaints Exam: AAOx3, NAD, RRR, CTA, S/NT/ND, the lateral swelling of submandibular gland without any expressible pus, redness, or tenderness, no swelling or tenderness underneath tongue, no palpable lymph nodes in neck, armpit, or groin  A/P:  Bilateral submandibular swelling  Given lack of any pain, will obtain CT scan to further evaluate and assess location of swelling  Symptoms likely coming from submandibular gland no sudden onset of bilateral swelling is atypical   Will also send off viral panel including mumps      Critical Care Time  CritCare Time    Procedures

## 2018-04-04 ENCOUNTER — OFFICE VISIT (OUTPATIENT)
Dept: AUDIOLOGY | Age: 83
End: 2018-04-04
Payer: MEDICARE

## 2018-04-04 DIAGNOSIS — H93.239 HYPERACUSIS, UNSPECIFIED LATERALITY: Primary | ICD-10-CM

## 2018-04-04 DIAGNOSIS — H90.3 SENSORY HEARING LOSS, BILATERAL: Primary | ICD-10-CM

## 2018-04-04 PROCEDURE — 92557 COMPREHENSIVE HEARING TEST: CPT
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
## 2018-06-20 ENCOUNTER — TELEPHONE (OUTPATIENT)
Dept: INTERNAL MEDICINE CLINIC | Facility: CLINIC | Age: 83
End: 2018-06-20

## 2018-06-20 NOTE — TELEPHONE ENCOUNTER
Pt daughter called Vero Mcpherson ( pt stated she is not on the hippa form , but the sister that is, she is [place] )   She is calling to request some advice about her father    Pt requesting a call please    She stated it is not urgent       Please Advise  [phone]

## 2018-06-20 NOTE — TELEPHONE ENCOUNTER
I will call her over the next couple days-please put copy of this note and patient's most recent office visit on work desk

## 2018-07-17 ENCOUNTER — APPOINTMENT (OUTPATIENT)
Dept: LAB | Age: 83
End: 2018-07-17
Payer: MEDICARE

## 2018-07-17 ENCOUNTER — TRANSCRIBE ORDERS (OUTPATIENT)
Dept: ADMINISTRATIVE | Age: 83
End: 2018-07-17

## 2018-07-17 DIAGNOSIS — E78.2 MIXED HYPERLIPIDEMIA: Chronic | ICD-10-CM

## 2018-07-17 DIAGNOSIS — I10 ESSENTIAL HYPERTENSION: Chronic | ICD-10-CM

## 2018-07-17 LAB
ALBUMIN SERPL BCP-MCNC: 3.6 G/DL (ref 3.5–5)
ALP SERPL-CCNC: 106 U/L (ref 46–116)
ALT SERPL W P-5'-P-CCNC: 25 U/L (ref 12–78)
ANION GAP SERPL CALCULATED.3IONS-SCNC: 7 MMOL/L (ref 4–13)
AST SERPL W P-5'-P-CCNC: 16 U/L (ref 5–45)
BASOPHILS # BLD AUTO: 0.03 THOUSANDS/ΜL (ref 0–0.1)
BASOPHILS NFR BLD AUTO: 0 % (ref 0–1)
BILIRUB SERPL-MCNC: 0.77 MG/DL (ref 0.2–1)
BUN SERPL-MCNC: 48 MG/DL (ref 5–25)
CALCIUM SERPL-MCNC: 9.1 MG/DL (ref 8.3–10.1)
CHLORIDE SERPL-SCNC: 109 MMOL/L (ref 100–108)
CHOLEST SERPL-MCNC: 98 MG/DL (ref 50–200)
CO2 SERPL-SCNC: 23 MMOL/L (ref 21–32)
CREAT SERPL-MCNC: 3.42 MG/DL (ref 0.6–1.3)
EOSINOPHIL # BLD AUTO: 0.23 THOUSAND/ΜL (ref 0–0.61)
EOSINOPHIL NFR BLD AUTO: 3 % (ref 0–6)
ERYTHROCYTE [DISTWIDTH] IN BLOOD BY AUTOMATED COUNT: 12.3 % (ref 11.6–15.1)
GFR SERPL CREATININE-BSD FRML MDRD: 16 ML/MIN/1.73SQ M
GLUCOSE P FAST SERPL-MCNC: 94 MG/DL (ref 65–99)
HCT VFR BLD AUTO: 38 % (ref 36.5–49.3)
HDLC SERPL-MCNC: 42 MG/DL (ref 40–60)
HGB BLD-MCNC: 13 G/DL (ref 12–17)
IMM GRANULOCYTES # BLD AUTO: 0.04 THOUSAND/UL (ref 0–0.2)
IMM GRANULOCYTES NFR BLD AUTO: 1 % (ref 0–2)
LDLC SERPL DIRECT ASSAY-MCNC: 53 MG/DL (ref 0–100)
LYMPHOCYTES # BLD AUTO: 1.15 THOUSANDS/ΜL (ref 0.6–4.47)
LYMPHOCYTES NFR BLD AUTO: 14 % (ref 14–44)
MCH RBC QN AUTO: 30.2 PG (ref 26.8–34.3)
MCHC RBC AUTO-ENTMCNC: 34.2 G/DL (ref 31.4–37.4)
MCV RBC AUTO: 88 FL (ref 82–98)
MONOCYTES # BLD AUTO: 0.86 THOUSAND/ΜL (ref 0.17–1.22)
MONOCYTES NFR BLD AUTO: 10 % (ref 4–12)
NEUTROPHILS # BLD AUTO: 6.13 THOUSANDS/ΜL (ref 1.85–7.62)
NEUTS SEG NFR BLD AUTO: 72 % (ref 43–75)
NRBC BLD AUTO-RTO: 0 /100 WBCS
PLATELET # BLD AUTO: 224 THOUSANDS/UL (ref 149–390)
PMV BLD AUTO: 11.2 FL (ref 8.9–12.7)
POTASSIUM SERPL-SCNC: 4.1 MMOL/L (ref 3.5–5.3)
PROT SERPL-MCNC: 7.5 G/DL (ref 6.4–8.2)
RBC # BLD AUTO: 4.31 MILLION/UL (ref 3.88–5.62)
SODIUM SERPL-SCNC: 139 MMOL/L (ref 136–145)
TRIGL SERPL-MCNC: 60 MG/DL
WBC # BLD AUTO: 8.44 THOUSAND/UL (ref 4.31–10.16)

## 2018-07-17 PROCEDURE — 80061 LIPID PANEL: CPT

## 2018-07-17 PROCEDURE — 85025 COMPLETE CBC W/AUTO DIFF WBC: CPT

## 2018-07-17 PROCEDURE — 80053 COMPREHEN METABOLIC PANEL: CPT

## 2018-07-17 PROCEDURE — 36415 COLL VENOUS BLD VENIPUNCTURE: CPT

## 2018-07-17 PROCEDURE — 83721 ASSAY OF BLOOD LIPOPROTEIN: CPT

## 2018-07-18 ENCOUNTER — TELEPHONE (OUTPATIENT)
Dept: INTERNAL MEDICINE CLINIC | Facility: CLINIC | Age: 83
End: 2018-07-18

## 2018-07-26 RX ORDER — DIPHENOXYLATE HYDROCHLORIDE AND ATROPINE SULFATE 2.5; .025 MG/1; MG/1
1 TABLET ORAL
COMMUNITY

## 2018-07-28 ENCOUNTER — OFFICE VISIT (OUTPATIENT)
Dept: INTERNAL MEDICINE CLINIC | Facility: CLINIC | Age: 83
End: 2018-07-28
Payer: MEDICARE

## 2018-07-28 DIAGNOSIS — N18.9 CHRONIC RENAL FAILURE, UNSPECIFIED CKD STAGE: Primary | ICD-10-CM

## 2018-07-28 DIAGNOSIS — J44.9 CHRONIC OBSTRUCTIVE PULMONARY DISEASE, UNSPECIFIED COPD TYPE (HCC): Chronic | ICD-10-CM

## 2018-07-28 DIAGNOSIS — N18.30 CHRONIC KIDNEY DISEASE, STAGE 3 (HCC): Primary | Chronic | ICD-10-CM

## 2018-07-28 DIAGNOSIS — C67.9 MALIGNANT NEOPLASM OF URINARY BLADDER, UNSPECIFIED SITE (HCC): Chronic | ICD-10-CM

## 2018-07-28 DIAGNOSIS — N17.9 ACUTE RENAL FAILURE, UNSPECIFIED ACUTE RENAL FAILURE TYPE (HCC): Chronic | ICD-10-CM

## 2018-07-28 DIAGNOSIS — E78.2 MIXED HYPERLIPIDEMIA: Chronic | ICD-10-CM

## 2018-07-28 DIAGNOSIS — I10 ESSENTIAL HYPERTENSION: Chronic | ICD-10-CM

## 2018-07-28 PROCEDURE — 99215 OFFICE O/P EST HI 40 MIN: CPT | Performed by: INTERNAL MEDICINE

## 2018-07-29 VITALS — RESPIRATION RATE: 14 BRPM | DIASTOLIC BLOOD PRESSURE: 78 MMHG | SYSTOLIC BLOOD PRESSURE: 128 MMHG | HEART RATE: 72 BPM

## 2018-07-29 PROBLEM — N17.9 ACUTE RENAL FAILURE (ARF) (HCC): Status: ACTIVE | Noted: 2018-07-29

## 2018-07-29 PROBLEM — N17.9 ACUTE RENAL FAILURE (ARF) (HCC): Chronic | Status: ACTIVE | Noted: 2018-07-29

## 2018-07-29 NOTE — PROGRESS NOTES
Assessment/Plan:  1  Acute renal failure superimposed on chronic renal failure  Creatinine has climbed to 3 42  He is asymptomatic but this is very worrisome  We reviewed this is likely related to progression of obstruction associated with his bladder carcinoma  He is avoiding nonsteroidals etc   Prior chronic renal failure with felt related to nephrosclerosis and/or renal artery stenosis  As noted also on CT scan he has right renal atrophy  I recommended repeat ultrasound of the kidneys as well as labs and referral to Urology to be seen over the next week at [facility]  Arrangements for this were made  2  Abdominal aortic aneurysm-noted on recent CT scan of 3 7 centimeters  Monitoring  Trying to get by with conservative therapy with his overall situation  3  Prior episode of confusion lasting only seconds  No associated headache or focal neurologic symptoms and has not recurred  Carotid Doppler shows less than 50% stenosis bilaterally  MRI of the brain shows mild to moderate small vessel disease  All labs stable other than mild elevation of his sed rate at 33  Has not had any recurrent neurologic symptoms and therefore monitoring  4  Recent episode of neck swelling-occurred after respiratory tract infection-likely viral   Angelo Bar virus panel shows old disease  He had manipulation of his glands by the ENT but no other treatment is overall much improved  5  Elevated sed rate-no other sed rates over the past couple years  Could have been with viral syndrome but could also be from his progressive carcinoma  6  Hypertension-adequate control on current dose of beta-blocker and amlodipine  7  COPD-stable at baseline  As noted most recent PFT showed mild disease  He remains on triple therapy for his COPD-asthma   8-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  Most recently had intravesicular Valrubicin-most recent cystoscopy continues to show some disease   The patient prefers to continue intravesicular therapy rather than cystectomy which is recommended by surgery  Urology feels that his disease is likely to progress if he does not have cystectomy but he refuses  He is being seen again in [place] over the next month   #9 kydrhimmewlqrl-dmgvvkhy-vqrowvbvkbgw-echo done in March of this year shows mild LVH, EF mildly impaired with mild diastolic dysfunction mild left atrial enlargement-unchanged from September 2015  He recently saw cardiology who made no change in his meds other than decreasing his dose of Lipitor which I agree wit   #10 hyperlipidemia-treat aggressively with his known CAD-LDL remains low and under 50 and as noted cardiology decreased his Lipitor from 80 down to 40 mg annabelle   #11 previously noted flank pain-resolved-CAT scan of abdomen showed perinephric stranding on the right with mild prominence of the right renal collecting system-was treated for potential UTI symptoms resolved   #12 angioedema-was on Cipro as well as ACE inhibitor one recurred-he had angioedema in the past from ACE inhibitor knows to avoid these agen  13  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-generally surgical repair recommended after 3 centimeters in patients had her not high operative risk-this point monitoring   #14asymptomatic carotid stenosis test less than 50% bilaterally  Consider follow-up carotid Doppler over the next couple visits #15 gout-asymptomatic times months   #16 CAD-had a CABG in 1994  Has known old inferior infarct  Nuclear stress test in May 2012 shows 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 MI with some degree of ischemia with low normal systolic function  Myoview in October 2014 shows old inferior MI, no ischemia with minimal decrease in his EF  Was on an ACE inhibitor but this was stopped because of angioedema  Not using angiotensin receptor blockers because of elevated creatinine   Recently saw cardiology felt to be stable   #17 mild hyperglycemia with normal hemoglobin A1c  #18 vitamin D deficiency-begin 2000 units daily         All other problems as per note of October 2001, June 2001   2       MEDICAL REGIMEN: Atorvastatin 80 MGs-half tab daily, Symbicort 160/4 5 taken as 2 puffs twice a day, Spiriva one dose daily, Singulair 10 mg daily, metoprolol succinate 25 mg daily, Claritin 10 mg daily as needed, baby aspirin daily, amlodipine 5 mg daily, Zetia 10 MGs-half tab daily, omega-3 fish oil-2000 mg a day , vitamin D 3/2000 units daily, Flomax 0 4 milligrams daily      Schedule here in 2 months but suspect will be having major intervention via Urology    Creatinine now over 6 done on July 30th  Ultrasound shows disease of the bladder, atrophic right kidney and some mild early Hydrea left kidney  Spoke extensively with patient's daughter as well as urologist at [facility] wants no intervention of any type and will be meeting with palliative care -his urologist from [facility] stating her cell number of [phone] is willing to intervene with some treatment such as nephrostomy etc patient is interested but he defers at present  No problem-specific Assessment & Plan notes found for this encounter  Diagnoses and all orders for this visit:    Chronic kidney disease, stage 3    Acute renal failure, unspecified acute renal failure type ([facility] )    Malignant neoplasm of urinary bladder, unspecified site [facility])    Chronic obstructive pulmonary disease, unspecified COPD type ([facility] )    Essential hypertension    Mixed hyperlipidemia    Other orders  -     multivitamin (THERAGRAN) TABS; Take 1 tablet by mouth  -     Mirabegron ER 25 MG TB24; Take 25 mg by mouth          Subjective:      Patient ID: Tino Dove is a 80 y o  male  As noted I had multiple conversation with this family and the patient regarding his progressive kidney disease  He had CT scan done with 3+ centimeter abdominal aneurysm  On CT scan he also has atrophy of 1 kidney  Bladder carcinoma is progressing    They met with Oncology who felt they could consider possible Oncology-Radiation Oncology  Oncologist felt he was not a candidate for chemo  Patient and family feels he cannot handle cystectomy  Radiation therapy felt he could not be treated unless he has simultaneous chemotherapy  Most recently a talked about palliative care  Creatinine previously was approximately 1 5-1 6  On labs done for the past couple weeks HDL 42 LDL 53 triglycerides 60 cholesterol 98 creatinine is now 3 42 with a BUN of 48 CBC normal   We had a very long discussion today regarding the above  Patient was accompanied to the visit by his daughter  It is likely that he has progression of bladder carcinoma with obstruction leading to worsening renal failure that is acute renal failure superimposed on chronic renal failure  They feel that his coordinating physician at [facility] is Kamila Mckenna is scheduled to see this oncologist over the next month  When she saw him in June she felt he should be considering surgery but then patient decided against this  We had a very long discussion today regarding the above  I made a drawing of the anatomy  We reviewed his acute renal failure superimposed on chronic renal failure  This was a 40 minutes visit with more than 50% of the time spent counseling the patient formulating a treatment plan    He has known hypertension  This could be influenced by his  situation  BP stable today on current therapy  Avoiding salt and decongestants    He is having some issues with insomnia and will be trying melatonin  I told him however more poorly he needs repeat labs as well as an ultrasound the kidney needs to be seen by Urology at [facility] over the next week  He is having some constipation which could also be related to progressive bladder carcinoma with bowel obstruction  For now year treating with Colace and MiraLax will continue that  He has a known history of diffuse coronary artery disease  He denies any chest pain or pressure with activity  Despite all the above his appetite is relatively stable  He denies any major anxiety or depressive symptoms  The following portions of the patient's history were reviewed and updated as appropriate: current medications, past family history, past medical history, past social history, past surgical history and problem list     Review of Systems   Constitutional: Negative  Respiratory: Positive for shortness of breath  Cardiovascular: Negative  Gastrointestinal: Negative  Endocrine: Negative  Genitourinary: Positive for frequency  Musculoskeletal: Negative  Neurological: Negative  Hematological: Negative  Psychiatric/Behavioral: Negative  Objective:      /78   Pulse 72   Resp 14          Physical Exam   Constitutional: He is oriented to person, place, and time  He appears well-developed and well-nourished  No distress  HENT:   Head: Normocephalic and atraumatic  Right Ear: External ear normal    Left Ear: External ear normal    Nose: Nose normal    Mouth/Throat: Oropharynx is clear and moist  No oropharyngeal exudate  Eyes: Conjunctivae and EOM are normal  Pupils are equal, round, and reactive to light  Right eye exhibits no discharge  Left eye exhibits no discharge  No scleral icterus  Neck: No JVD present  No tracheal deviation present  No thyromegaly present  Cardiovascular: Normal rate, regular rhythm, normal heart sounds and intact distal pulses  Exam reveals no gallop and no friction rub  No murmur heard  Pulmonary/Chest: Effort normal and breath sounds normal  No stridor  No respiratory distress  He has no wheezes  He exhibits no tenderness  Scar from prior cardiac surgery   Abdominal: Soft  Bowel sounds are normal  He exhibits no distension and no mass  There is no tenderness  There is no rebound and no guarding     No definite palpable abdominal mass   Genitourinary: Rectum normal, prostate normal and penis normal  Rectal exam shows guaiac negative stool  Musculoskeletal: Normal range of motion  He exhibits no edema, tenderness or deformity  Lymphadenopathy:     He has no cervical adenopathy  Neurological: He is alert and oriented to person, place, and time  He has normal reflexes  No cranial nerve deficit  He exhibits normal muscle tone  Coordination normal    Skin: Skin is warm and dry  No rash noted  He is not diaphoretic  No erythema  No pallor  Psychiatric: He has a normal mood and affect  His behavior is normal  Judgment and thought content normal    Vitals reviewed

## 2018-07-30 ENCOUNTER — HOSPITAL ENCOUNTER (OUTPATIENT)
Dept: RADIOLOGY | Age: 83
Discharge: HOME | End: 2018-07-30
Payer: MEDICARE

## 2018-07-30 ENCOUNTER — APPOINTMENT (OUTPATIENT)
Dept: LAB | Age: 83
End: 2018-07-30
Payer: MEDICARE

## 2018-07-30 DIAGNOSIS — N18.9 CHRONIC RENAL FAILURE, UNSPECIFIED CKD STAGE: ICD-10-CM

## 2018-07-30 LAB
ALBUMIN SERPL BCP-MCNC: 3.2 G/DL (ref 3.5–5)
ALP SERPL-CCNC: 117 U/L (ref 46–116)
ALT SERPL W P-5'-P-CCNC: 21 U/L (ref 12–78)
ANION GAP SERPL CALCULATED.3IONS-SCNC: 9 MMOL/L (ref 4–13)
AST SERPL W P-5'-P-CCNC: 14 U/L (ref 5–45)
BASOPHILS # BLD AUTO: 0.05 THOUSANDS/ΜL (ref 0–0.1)
BASOPHILS NFR BLD AUTO: 1 % (ref 0–1)
BILIRUB SERPL-MCNC: 0.6 MG/DL (ref 0.2–1)
BUN SERPL-MCNC: 100 MG/DL (ref 5–25)
CALCIUM SERPL-MCNC: 9.1 MG/DL (ref 8.3–10.1)
CHLORIDE SERPL-SCNC: 107 MMOL/L (ref 100–108)
CO2 SERPL-SCNC: 19 MMOL/L (ref 21–32)
CREAT SERPL-MCNC: 6.38 MG/DL (ref 0.6–1.3)
EOSINOPHIL # BLD AUTO: 0.11 THOUSAND/ΜL (ref 0–0.61)
EOSINOPHIL NFR BLD AUTO: 1 % (ref 0–6)
ERYTHROCYTE [DISTWIDTH] IN BLOOD BY AUTOMATED COUNT: 12.1 % (ref 11.6–15.1)
GFR SERPL CREATININE-BSD FRML MDRD: 7 ML/MIN/1.73SQ M
GLUCOSE SERPL-MCNC: 104 MG/DL (ref 65–140)
HCT VFR BLD AUTO: 35.2 % (ref 36.5–49.3)
HGB BLD-MCNC: 11.6 G/DL (ref 12–17)
IMM GRANULOCYTES # BLD AUTO: 0.04 THOUSAND/UL (ref 0–0.2)
IMM GRANULOCYTES NFR BLD AUTO: 0 % (ref 0–2)
LYMPHOCYTES # BLD AUTO: 0.98 THOUSANDS/ΜL (ref 0.6–4.47)
LYMPHOCYTES NFR BLD AUTO: 10 % (ref 14–44)
MCH RBC QN AUTO: 29.2 PG (ref 26.8–34.3)
MCHC RBC AUTO-ENTMCNC: 33 G/DL (ref 31.4–37.4)
MCV RBC AUTO: 89 FL (ref 82–98)
MONOCYTES # BLD AUTO: 0.92 THOUSAND/ΜL (ref 0.17–1.22)
MONOCYTES NFR BLD AUTO: 10 % (ref 4–12)
NEUTROPHILS # BLD AUTO: 7.3 THOUSANDS/ΜL (ref 1.85–7.62)
NEUTS SEG NFR BLD AUTO: 78 % (ref 43–75)
NRBC BLD AUTO-RTO: 0 /100 WBCS
PLATELET # BLD AUTO: 318 THOUSANDS/UL (ref 149–390)
PMV BLD AUTO: 10.5 FL (ref 8.9–12.7)
POTASSIUM SERPL-SCNC: 5.1 MMOL/L (ref 3.5–5.3)
PROT SERPL-MCNC: 7.6 G/DL (ref 6.4–8.2)
RBC # BLD AUTO: 3.97 MILLION/UL (ref 3.88–5.62)
SODIUM SERPL-SCNC: 135 MMOL/L (ref 136–145)
WBC # BLD AUTO: 9.4 THOUSAND/UL (ref 4.31–10.16)

## 2018-07-30 PROCEDURE — 80053 COMPREHEN METABOLIC PANEL: CPT

## 2018-07-30 PROCEDURE — 36415 COLL VENOUS BLD VENIPUNCTURE: CPT

## 2018-07-30 PROCEDURE — 76770 US EXAM ABDO BACK WALL COMP: CPT

## 2018-07-30 PROCEDURE — 85025 COMPLETE CBC W/AUTO DIFF WBC: CPT

## 2018-07-31 ENCOUNTER — OFFICE VISIT (OUTPATIENT)
Dept: PALLIATIVE MEDICINE | Facility: CLINIC | Age: 83
End: 2018-07-31
Payer: MEDICARE

## 2018-07-31 VITALS
DIASTOLIC BLOOD PRESSURE: 80 MMHG | WEIGHT: 172 LBS | BODY MASS INDEX: 25.4 KG/M2 | OXYGEN SATURATION: 98 % | SYSTOLIC BLOOD PRESSURE: 152 MMHG | TEMPERATURE: 98.1 F | HEART RATE: 83 BPM

## 2018-07-31 DIAGNOSIS — C67.9 MALIGNANT NEOPLASM OF URINARY BLADDER, UNSPECIFIED SITE (HCC): Primary | ICD-10-CM

## 2018-07-31 DIAGNOSIS — J44.9 CHRONIC OBSTRUCTIVE PULMONARY DISEASE, UNSPECIFIED COPD TYPE (HCC): Chronic | ICD-10-CM

## 2018-07-31 DIAGNOSIS — N18.5 ACUTE RENAL FAILURE WITH OTHER SPECIFIED PATHOLOGICAL KIDNEY LESION SUPERIMPOSED ON STAGE 5 CHRONIC KIDNEY DISEASE, NOT ON CHRONIC DIALYSIS (HCC): ICD-10-CM

## 2018-07-31 DIAGNOSIS — N17.8 ACUTE RENAL FAILURE WITH OTHER SPECIFIED PATHOLOGICAL KIDNEY LESION SUPERIMPOSED ON STAGE 5 CHRONIC KIDNEY DISEASE, NOT ON CHRONIC DIALYSIS (HCC): ICD-10-CM

## 2018-07-31 PROCEDURE — 99204 OFFICE O/P NEW MOD 45 MIN: CPT | Performed by: FAMILY MEDICINE

## 2018-07-31 NOTE — PROGRESS NOTES
Assessment/Plan:  Patient Active Problem List   Diagnosis    Arthritis    Asymptomatic carotid artery stenosis    Atherosclerosis    Benign prostatic hyperplasia    Bladder cancer ([facility] )    CAD (coronary artery disease)    Chronic kidney disease, stage 3    Chronic obstructive pulmonary disease (HCC)    Confusion    Dyspnea    Essential hypertension    Hematuria    History of MI (myocardial infarction)    Hyperglycemia    Hyperlipidemia    Pain, joint, shoulder    Right rotator cuff tear    Vitamin D deficiency    Neck swelling    Acute renal failure (ARF) ([facility])       No problem-specific Assessment & Plan notes found for this encounter  referral for home hospice  Diagnoses and all orders for this visit:    Malignant neoplasm of urinary bladder, unspecified site [facility])  -     Ambulatory referral to hospice; Future    Acute renal failure with other specified pathological kidney lesion superimposed on stage 5 chronic kidney disease, not on chronic dialysis [facility])  -     Ambulatory referral to hospice; Future    Chronic obstructive pulmonary disease, unspecified COPD type ([facility] )          Subjective:      Patient ID: Billy Begum is a 80 y o  male  Mr Rolly James is seen in outpatient consultation at the request of her daughter and his PCP, Dr Berenice Hassan  He has underlying COPD which is stable  He has bladder cancer that was diagnosed in 2016  He has recent increasing creatinine and BUN (was up to 3 42 and is now 6 38 with )  He understands that his renal function is declining rapidly and that he would need dialysis  He understands that without dialysis he will decline an die - likely in weeks  He is very clear that he will not pursue dialysis  He has a strong harsha and hopes for a miracle, though he verbalizes understanding that there will not be any changes to what is happening to his body  He is not afraid of dying but does not want to suffer   He has been a  for most of his life and has 2 guns at home and he quite clearly relates that he would not kill himself understanding the impact on his family  He has some pain though he is down playing that at this point  He wants to be able to stay at home  His daughter is his support and proxy decision maker and she is in support of hospice  He feels sad at the losses he will experience in not being a part of his families lives (grandchildren and great grandchildren)        The following portions of the patient's history were reviewed and updated as appropriate: allergies, current medications, past family history, past medical history, past social history, past surgical history and problem list     Review of Systems   Constitutional: Positive for activity change, appetite change and fatigue  Negative for chills, diaphoresis, fever and unexpected weight change  HENT: Negative  Eyes: Negative  Respiratory: Positive for shortness of breath  Cardiovascular: Negative  Gastrointestinal: Positive for abdominal pain  Endocrine: Negative  Genitourinary: Positive for decreased urine volume  Musculoskeletal: Negative  Skin: Negative  Allergic/Immunologic: Negative  Neurological: Negative  Hematological: Negative  Psychiatric/Behavioral: Positive for dysphoric mood  Objective:      /80 (BP Location: Left arm, Cuff Size: Standard)   Pulse 83   Temp 98 1 °F (36 7 °C) (Oral)   Wt 78 kg (172 lb)   SpO2 98%   BMI 25 40 kg/m²          Physical Exam   Constitutional: He is oriented to person, place, and time  He appears well-developed and well-nourished  No distress  HENT:   Head: Normocephalic and atraumatic  Right Ear: External ear normal    Left Ear: External ear normal    Nose: Nose normal    Mouth/Throat: Oropharynx is clear and moist  No oropharyngeal exudate  Eyes: Conjunctivae and EOM are normal  Pupils are equal, round, and reactive to light  Right eye exhibits no discharge   Left eye exhibits no discharge  No scleral icterus  Neck: Normal range of motion  Neck supple  No JVD present  No tracheal deviation present  No thyromegaly present  Cardiovascular: Normal rate, regular rhythm, normal heart sounds and intact distal pulses  Exam reveals no gallop and no friction rub  No murmur heard  Pulmonary/Chest: Effort normal  No accessory muscle usage or stridor  No tachypnea and no bradypnea  No respiratory distress  He has decreased breath sounds in the right upper field, the right middle field, the right lower field, the left upper field, the left middle field and the left lower field  Abdominal: Soft  Bowel sounds are normal  He exhibits no distension and no mass  There is no tenderness  There is no rebound and no guarding  Musculoskeletal: Normal range of motion  He exhibits no edema, tenderness or deformity  Lymphadenopathy:     He has no cervical adenopathy  Neurological: He is alert and oriented to person, place, and time  He displays normal reflexes  No cranial nerve deficit  He exhibits normal muscle tone  Coordination normal    Skin: Skin is warm and dry  No rash noted  He is not diaphoretic  No erythema  No pallor  Psychiatric: His speech is normal  Judgment and thought content normal  He is withdrawn  Cognition and memory are normal  He exhibits a depressed mood  Vitals reviewed

## 2018-08-01 ENCOUNTER — TELEPHONE (OUTPATIENT)
Dept: INTERNAL MEDICINE CLINIC | Facility: CLINIC | Age: 83
End: 2018-08-01

## 2019-12-03 ENCOUNTER — DOCUMENTATION (OUTPATIENT)
Dept: AUDIOLOGY | Age: 84
End: 2019-12-03

## 2019-12-03 NOTE — PROGRESS NOTES
Progress Note    Name:  Sandy Koenig  :  1934  Age:  80 y o  Date of Evaluation: 19     Scanned documents         Minna Sanchez   Clinical Audiologist

## 2020-03-20 NOTE — PROGRESS NOTES
Progress Note    Name:  Bela Davis  :  1934  Age:  80 y o  Date of Evaluation: 20     Scanned in HA chart part 2         Minna Ortiz , CCC-A  Clinical Audiologist